# Patient Record
Sex: FEMALE | URBAN - METROPOLITAN AREA
[De-identification: names, ages, dates, MRNs, and addresses within clinical notes are randomized per-mention and may not be internally consistent; named-entity substitution may affect disease eponyms.]

---

## 2018-06-05 ENCOUNTER — APPOINTMENT (RX ONLY)
Dept: URBAN - METROPOLITAN AREA CLINIC 26 | Facility: CLINIC | Age: 73
Setting detail: DERMATOLOGY
End: 2018-06-05

## 2018-06-05 DIAGNOSIS — L81.4 OTHER MELANIN HYPERPIGMENTATION: ICD-10-CM

## 2018-06-05 DIAGNOSIS — Z85.828 PERSONAL HISTORY OF OTHER MALIGNANT NEOPLASM OF SKIN: ICD-10-CM

## 2018-06-05 DIAGNOSIS — L82.1 OTHER SEBORRHEIC KERATOSIS: ICD-10-CM

## 2018-06-05 DIAGNOSIS — L57.8 OTHER SKIN CHANGES DUE TO CHRONIC EXPOSURE TO NONIONIZING RADIATION: ICD-10-CM

## 2018-06-05 DIAGNOSIS — D22 MELANOCYTIC NEVI: ICD-10-CM

## 2018-06-05 DIAGNOSIS — D18.0 HEMANGIOMA: ICD-10-CM

## 2018-06-05 PROBLEM — D18.01 HEMANGIOMA OF SKIN AND SUBCUTANEOUS TISSUE: Status: ACTIVE | Noted: 2018-06-05

## 2018-06-05 PROBLEM — D22.5 MELANOCYTIC NEVI OF TRUNK: Status: ACTIVE | Noted: 2018-06-05

## 2018-06-05 PROCEDURE — ? COUNSELING

## 2018-06-05 PROCEDURE — ? SUNSCREEN RECOMMENDATIONS

## 2018-06-05 PROCEDURE — 99214 OFFICE O/P EST MOD 30 MIN: CPT

## 2018-06-05 ASSESSMENT — LOCATION ZONE DERM
LOCATION ZONE: TRUNK
LOCATION ZONE: LEG
LOCATION ZONE: FACE
LOCATION ZONE: ARM

## 2018-06-05 ASSESSMENT — LOCATION SIMPLE DESCRIPTION DERM
LOCATION SIMPLE: RIGHT FOREARM
LOCATION SIMPLE: LEFT UPPER ARM
LOCATION SIMPLE: LEFT CHEEK
LOCATION SIMPLE: LEFT PRETIBIAL REGION
LOCATION SIMPLE: ABDOMEN
LOCATION SIMPLE: LOWER BACK
LOCATION SIMPLE: LEFT FOREARM
LOCATION SIMPLE: RIGHT UPPER ARM
LOCATION SIMPLE: RIGHT PRETIBIAL REGION
LOCATION SIMPLE: RIGHT UPPER BACK

## 2018-06-05 ASSESSMENT — LOCATION DETAILED DESCRIPTION DERM
LOCATION DETAILED: LEFT PROXIMAL PRETIBIAL REGION
LOCATION DETAILED: EPIGASTRIC SKIN
LOCATION DETAILED: SUPERIOR LUMBAR SPINE
LOCATION DETAILED: LEFT VENTRAL PROXIMAL FOREARM
LOCATION DETAILED: RIGHT INFERIOR MEDIAL UPPER BACK
LOCATION DETAILED: RIGHT PROXIMAL PRETIBIAL REGION
LOCATION DETAILED: PERIUMBILICAL SKIN
LOCATION DETAILED: LEFT ANTERIOR DISTAL UPPER ARM
LOCATION DETAILED: RIGHT VENTRAL PROXIMAL FOREARM
LOCATION DETAILED: LEFT INFERIOR CENTRAL MALAR CHEEK
LOCATION DETAILED: RIGHT ANTERIOR DISTAL UPPER ARM

## 2018-12-04 ENCOUNTER — APPOINTMENT (RX ONLY)
Dept: URBAN - METROPOLITAN AREA CLINIC 26 | Facility: CLINIC | Age: 73
Setting detail: DERMATOLOGY
End: 2018-12-04

## 2018-12-04 DIAGNOSIS — Z85.828 PERSONAL HISTORY OF OTHER MALIGNANT NEOPLASM OF SKIN: ICD-10-CM

## 2018-12-04 DIAGNOSIS — L57.8 OTHER SKIN CHANGES DUE TO CHRONIC EXPOSURE TO NONIONIZING RADIATION: ICD-10-CM

## 2018-12-04 DIAGNOSIS — D18.0 HEMANGIOMA: ICD-10-CM

## 2018-12-04 DIAGNOSIS — L20.89 OTHER ATOPIC DERMATITIS: ICD-10-CM

## 2018-12-04 DIAGNOSIS — L81.4 OTHER MELANIN HYPERPIGMENTATION: ICD-10-CM

## 2018-12-04 DIAGNOSIS — D22 MELANOCYTIC NEVI: ICD-10-CM

## 2018-12-04 DIAGNOSIS — L82.1 OTHER SEBORRHEIC KERATOSIS: ICD-10-CM

## 2018-12-04 PROBLEM — D22.5 MELANOCYTIC NEVI OF TRUNK: Status: ACTIVE | Noted: 2018-12-04

## 2018-12-04 PROBLEM — L20.84 INTRINSIC (ALLERGIC) ECZEMA: Status: ACTIVE | Noted: 2018-12-04

## 2018-12-04 PROBLEM — D18.01 HEMANGIOMA OF SKIN AND SUBCUTANEOUS TISSUE: Status: ACTIVE | Noted: 2018-12-04

## 2018-12-04 PROCEDURE — 99214 OFFICE O/P EST MOD 30 MIN: CPT

## 2018-12-04 PROCEDURE — ? SUNSCREEN RECOMMENDATIONS

## 2018-12-04 PROCEDURE — ? PRESCRIPTION

## 2018-12-04 PROCEDURE — ? COUNSELING

## 2018-12-04 RX ORDER — CLOBETASOL PROPIONATE 0.5 MG/G
OINTMENT TOPICAL
Qty: 1 | Refills: 2 | Status: ERX | COMMUNITY
Start: 2018-12-04

## 2018-12-04 RX ADMIN — CLOBETASOL PROPIONATE: 0.5 OINTMENT TOPICAL at 00:00

## 2018-12-04 ASSESSMENT — LOCATION SIMPLE DESCRIPTION DERM
LOCATION SIMPLE: RIGHT HAND
LOCATION SIMPLE: RIGHT FOREARM
LOCATION SIMPLE: LEFT FOREARM
LOCATION SIMPLE: LEFT UPPER ARM
LOCATION SIMPLE: RIGHT UPPER BACK
LOCATION SIMPLE: LOWER BACK
LOCATION SIMPLE: RIGHT PRETIBIAL REGION
LOCATION SIMPLE: ABDOMEN
LOCATION SIMPLE: LEFT PRETIBIAL REGION
LOCATION SIMPLE: LEFT CHEEK
LOCATION SIMPLE: RIGHT UPPER ARM

## 2018-12-04 ASSESSMENT — LOCATION ZONE DERM
LOCATION ZONE: TRUNK
LOCATION ZONE: HAND
LOCATION ZONE: FACE
LOCATION ZONE: LEG
LOCATION ZONE: ARM

## 2018-12-04 ASSESSMENT — LOCATION DETAILED DESCRIPTION DERM
LOCATION DETAILED: RIGHT INFERIOR MEDIAL UPPER BACK
LOCATION DETAILED: RIGHT PROXIMAL PRETIBIAL REGION
LOCATION DETAILED: LEFT INFERIOR CENTRAL MALAR CHEEK
LOCATION DETAILED: RIGHT VENTRAL PROXIMAL FOREARM
LOCATION DETAILED: SUPERIOR LUMBAR SPINE
LOCATION DETAILED: EPIGASTRIC SKIN
LOCATION DETAILED: RIGHT ANTERIOR DISTAL UPPER ARM
LOCATION DETAILED: PERIUMBILICAL SKIN
LOCATION DETAILED: LEFT PROXIMAL PRETIBIAL REGION
LOCATION DETAILED: RIGHT RADIAL DORSAL HAND
LOCATION DETAILED: LEFT ANTERIOR DISTAL UPPER ARM
LOCATION DETAILED: LEFT VENTRAL PROXIMAL FOREARM

## 2018-12-19 ENCOUNTER — APPOINTMENT (RX ONLY)
Dept: URBAN - METROPOLITAN AREA CLINIC 26 | Facility: CLINIC | Age: 73
Setting detail: DERMATOLOGY
End: 2018-12-19

## 2018-12-19 DIAGNOSIS — L81.4 OTHER MELANIN HYPERPIGMENTATION: ICD-10-CM

## 2018-12-19 PROCEDURE — ? Q-SWITCHED LASER

## 2018-12-19 ASSESSMENT — LOCATION SIMPLE DESCRIPTION DERM: LOCATION SIMPLE: RIGHT PRETIBIAL REGION

## 2018-12-19 ASSESSMENT — LOCATION DETAILED DESCRIPTION DERM: LOCATION DETAILED: RIGHT PROXIMAL PRETIBIAL REGION

## 2018-12-19 ASSESSMENT — LOCATION ZONE DERM: LOCATION ZONE: LEG

## 2018-12-19 NOTE — PROCEDURE: Q-SWITCHED LASER
Spot Size In Mm: 2
External Cooling Fan Speed: 0
Anesthesia Type: 1% lidocaine with epinephrine
Endpoint: Immediate endpoint whitening. Post care reviewed with patient.
Frequency In Hz: 5
Price (Use Numbers Only, No Special Characters Or $): 200
Detail Level: Detailed
Treatment Number: 1
Consent: Verbal consent obtained, risks reviewed including but not limited to crusting, scabbing, blistering, scarring, darker or lighter pigmentary change, systemic reactions, ulceration, incidental hair removal, bruising, and/or incomplete removal.
Post-Care Instructions: I reviewed with the patient in detail post-care instructions. Patient should avoid sunlight and wear sun protection.

## 2019-06-04 ENCOUNTER — APPOINTMENT (RX ONLY)
Dept: URBAN - METROPOLITAN AREA CLINIC 26 | Facility: CLINIC | Age: 74
Setting detail: DERMATOLOGY
End: 2019-06-04

## 2019-06-04 DIAGNOSIS — L82.1 OTHER SEBORRHEIC KERATOSIS: ICD-10-CM

## 2019-06-04 DIAGNOSIS — Z85.828 PERSONAL HISTORY OF OTHER MALIGNANT NEOPLASM OF SKIN: ICD-10-CM | Status: RESOLVED

## 2019-06-04 DIAGNOSIS — L81.4 OTHER MELANIN HYPERPIGMENTATION: ICD-10-CM

## 2019-06-04 DIAGNOSIS — D18.0 HEMANGIOMA: ICD-10-CM

## 2019-06-04 DIAGNOSIS — D22 MELANOCYTIC NEVI: ICD-10-CM

## 2019-06-04 DIAGNOSIS — L57.0 ACTINIC KERATOSIS: ICD-10-CM

## 2019-06-04 PROBLEM — D22.71 MELANOCYTIC NEVI OF RIGHT LOWER LIMB, INCLUDING HIP: Status: ACTIVE | Noted: 2019-06-04

## 2019-06-04 PROBLEM — D22.72 MELANOCYTIC NEVI OF LEFT LOWER LIMB, INCLUDING HIP: Status: ACTIVE | Noted: 2019-06-04

## 2019-06-04 PROBLEM — D18.01 HEMANGIOMA OF SKIN AND SUBCUTANEOUS TISSUE: Status: ACTIVE | Noted: 2019-06-04

## 2019-06-04 PROCEDURE — ? SUNSCREEN RECOMMENDATIONS

## 2019-06-04 PROCEDURE — ? INVENTORY

## 2019-06-04 PROCEDURE — 17003 DESTRUCT PREMALG LES 2-14: CPT

## 2019-06-04 PROCEDURE — 17000 DESTRUCT PREMALG LESION: CPT

## 2019-06-04 PROCEDURE — ? COUNSELING

## 2019-06-04 PROCEDURE — 99214 OFFICE O/P EST MOD 30 MIN: CPT | Mod: 25

## 2019-06-04 PROCEDURE — ? LIQUID NITROGEN

## 2019-06-04 ASSESSMENT — LOCATION DETAILED DESCRIPTION DERM
LOCATION DETAILED: EPIGASTRIC SKIN
LOCATION DETAILED: RIGHT ANTERIOR DISTAL UPPER ARM
LOCATION DETAILED: LEFT ANTERIOR DISTAL UPPER ARM
LOCATION DETAILED: RIGHT INFERIOR CENTRAL MALAR CHEEK
LOCATION DETAILED: LEFT MEDIAL SUPERIOR CHEST
LOCATION DETAILED: RIGHT PROXIMAL POSTERIOR THIGH
LOCATION DETAILED: RIGHT SUPERIOR MEDIAL UPPER BACK
LOCATION DETAILED: LEFT PROXIMAL POSTERIOR THIGH

## 2019-06-04 ASSESSMENT — LOCATION SIMPLE DESCRIPTION DERM
LOCATION SIMPLE: ABDOMEN
LOCATION SIMPLE: CHEST
LOCATION SIMPLE: RIGHT UPPER BACK
LOCATION SIMPLE: RIGHT UPPER ARM
LOCATION SIMPLE: RIGHT CHEEK
LOCATION SIMPLE: LEFT POSTERIOR THIGH
LOCATION SIMPLE: RIGHT POSTERIOR THIGH
LOCATION SIMPLE: LEFT UPPER ARM

## 2019-06-04 ASSESSMENT — LOCATION ZONE DERM
LOCATION ZONE: FACE
LOCATION ZONE: TRUNK
LOCATION ZONE: LEG
LOCATION ZONE: ARM

## 2019-06-04 NOTE — PROCEDURE: MIPS QUALITY
Name And Contact Information For Health Care Proxy: Baldo Mati [phone] Name And Contact Information For Health Care Proxy: Baldo Mati [phone]

## 2019-06-04 NOTE — PROCEDURE: LIQUID NITROGEN
Consent: The patient's consent was obtained including but not limited to risks of crusting, scabbing, blistering, scarring, darker or lighter pigmentary change, recurrence, incomplete removal and infection.
Total Number Of Aks Treated: 4
Render Post-Care Instructions In Note?: no
Post-Care Instructions: I reviewed with the patient in detail post-care instructions. Patient is to wear sunprotection, and avoid picking at any of the treated lesions. Pt may apply Vaseline to crusted or scabbing areas.
Duration Of Freeze Thaw-Cycle (Seconds): 0
Detail Level: Zone

## 2019-08-19 ENCOUNTER — APPOINTMENT (RX ONLY)
Dept: URBAN - METROPOLITAN AREA CLINIC 26 | Facility: CLINIC | Age: 74
Setting detail: DERMATOLOGY
End: 2019-08-19

## 2019-08-19 DIAGNOSIS — L82.1 OTHER SEBORRHEIC KERATOSIS: ICD-10-CM

## 2019-08-19 PROCEDURE — ? COUNSELING

## 2019-08-19 PROCEDURE — 99213 OFFICE O/P EST LOW 20 MIN: CPT

## 2019-08-19 ASSESSMENT — LOCATION ZONE DERM
LOCATION ZONE: ARM
LOCATION ZONE: TRUNK

## 2019-08-19 ASSESSMENT — LOCATION DETAILED DESCRIPTION DERM
LOCATION DETAILED: LEFT INFERIOR MEDIAL UPPER BACK
LOCATION DETAILED: RIGHT ANTERIOR SHOULDER

## 2019-08-19 ASSESSMENT — LOCATION SIMPLE DESCRIPTION DERM
LOCATION SIMPLE: RIGHT SHOULDER
LOCATION SIMPLE: LEFT UPPER BACK

## 2019-08-19 NOTE — HPI: SKIN LESION
Has Your Skin Lesion Been Treated?: not been treated
Is This A New Presentation, Or A Follow-Up?: Skin Lesions
Additional History: Patient states that she has 2 on the right should she would like evaluated; she states her  noticed the one on the lower back and wanted to make sure it was not concerning.

## 2019-12-04 ENCOUNTER — APPOINTMENT (RX ONLY)
Dept: URBAN - METROPOLITAN AREA CLINIC 26 | Facility: CLINIC | Age: 74
Setting detail: DERMATOLOGY
End: 2019-12-04

## 2019-12-04 DIAGNOSIS — L82.1 OTHER SEBORRHEIC KERATOSIS: ICD-10-CM

## 2019-12-04 DIAGNOSIS — D22 MELANOCYTIC NEVI: ICD-10-CM

## 2019-12-04 DIAGNOSIS — L82.0 INFLAMED SEBORRHEIC KERATOSIS: ICD-10-CM

## 2019-12-04 DIAGNOSIS — L57.8 OTHER SKIN CHANGES DUE TO CHRONIC EXPOSURE TO NONIONIZING RADIATION: ICD-10-CM

## 2019-12-04 DIAGNOSIS — Z85.828 PERSONAL HISTORY OF OTHER MALIGNANT NEOPLASM OF SKIN: ICD-10-CM

## 2019-12-04 PROBLEM — D22.9 MELANOCYTIC NEVI, UNSPECIFIED: Status: ACTIVE | Noted: 2019-12-04

## 2019-12-04 PROCEDURE — ? LIQUID NITROGEN

## 2019-12-04 PROCEDURE — ? PATIENT SPECIFIC COUNSELING

## 2019-12-04 PROCEDURE — 99214 OFFICE O/P EST MOD 30 MIN: CPT | Mod: 25

## 2019-12-04 PROCEDURE — 17110 DESTRUCTION B9 LES UP TO 14: CPT

## 2019-12-04 PROCEDURE — ? FULL BODY SKIN EXAM

## 2019-12-04 PROCEDURE — ? COUNSELING

## 2019-12-04 PROCEDURE — ? MEDICARE ABN

## 2019-12-04 ASSESSMENT — LOCATION DETAILED DESCRIPTION DERM: LOCATION DETAILED: RIGHT ANTERIOR SHOULDER

## 2019-12-04 ASSESSMENT — LOCATION ZONE DERM: LOCATION ZONE: ARM

## 2019-12-04 ASSESSMENT — LOCATION SIMPLE DESCRIPTION DERM: LOCATION SIMPLE: RIGHT SHOULDER

## 2019-12-04 NOTE — PROCEDURE: MIPS QUALITY
Detail Level: Detailed
Quality 130: Documentation Of Current Medications In The Medical Record: Current Medications Documented
Quality 431: Preventive Care And Screening: Unhealthy Alcohol Use - Screening: Patient screened for unhealthy alcohol use using a single question and scores less than 2 times per year
Quality 226: Preventive Care And Screening: Tobacco Use: Screening And Cessation Intervention: Patient screened for tobacco use and is an ex/non-smoker
Quality 47: Advance Care Plan: Advance Care Planning discussed and documented; advance care plan or surrogate decision maker documented in the medical record.

## 2019-12-04 NOTE — PROCEDURE: MEDICARE ABN
Procedure (Limit To 20 Characters): benign destruction
Payment Option: Option 1: Bill Medicare, await for decision on payment.
Detail Level: Detailed
Reason?: Additional Information
Modifier Description: The following is a description of the modifiers listed below. GA - is added to claims with a properly executed Advanced Beneficiary Notice (ABN) in the file. GY - is added to claims in which the item or service is statutorily excluded, does not meet the definition of any Medicare benefit, or -for non-Medicare Insurers- is not a contract benefit. GZ - - Item or service expected to be denied as not reasonable and necessary.

## 2020-01-14 ENCOUNTER — APPOINTMENT (RX ONLY)
Dept: URBAN - METROPOLITAN AREA CLINIC 23 | Facility: CLINIC | Age: 75
Setting detail: DERMATOLOGY
End: 2020-01-14

## 2020-01-14 DIAGNOSIS — L81.4 OTHER MELANIN HYPERPIGMENTATION: ICD-10-CM

## 2020-01-14 DIAGNOSIS — L85.3 XEROSIS CUTIS: ICD-10-CM

## 2020-01-14 PROCEDURE — ? PRESCRIPTION

## 2020-01-14 PROCEDURE — 99213 OFFICE O/P EST LOW 20 MIN: CPT

## 2020-01-14 PROCEDURE — ? COUNSELING

## 2020-01-14 PROCEDURE — ? ADDITIONAL NOTES

## 2020-01-14 RX ORDER — TRIAMCINOLONE ACETONIDE 1 MG/G
CREAM TOPICAL
Qty: 1 | Refills: 2 | Status: ERX | COMMUNITY
Start: 2020-01-14

## 2020-01-14 RX ADMIN — TRIAMCINOLONE ACETONIDE: 1 CREAM TOPICAL at 00:00

## 2020-01-14 ASSESSMENT — LOCATION DETAILED DESCRIPTION DERM
LOCATION DETAILED: RIGHT PROXIMAL PRETIBIAL REGION
LOCATION DETAILED: RIGHT DISTAL PRETIBIAL REGION
LOCATION DETAILED: LEFT DISTAL PRETIBIAL REGION

## 2020-01-14 ASSESSMENT — LOCATION SIMPLE DESCRIPTION DERM
LOCATION SIMPLE: LEFT PRETIBIAL REGION
LOCATION SIMPLE: RIGHT PRETIBIAL REGION

## 2020-01-14 ASSESSMENT — LOCATION ZONE DERM: LOCATION ZONE: LEG

## 2020-01-14 NOTE — PROCEDURE: ADDITIONAL NOTES
Additional Notes: Recommend chemical peels with strict sun protection. It was explained that she may need a couple of sessions to see lightening effects.
Detail Level: Detailed

## 2020-06-18 ENCOUNTER — APPOINTMENT (RX ONLY)
Dept: URBAN - METROPOLITAN AREA CLINIC 26 | Facility: CLINIC | Age: 75
Setting detail: DERMATOLOGY
End: 2020-06-18

## 2020-06-18 DIAGNOSIS — D69.2 OTHER NONTHROMBOCYTOPENIC PURPURA: ICD-10-CM

## 2020-06-18 PROCEDURE — 99212 OFFICE O/P EST SF 10 MIN: CPT

## 2020-06-18 PROCEDURE — ? ADDITIONAL NOTES

## 2020-06-18 PROCEDURE — ? COUNSELING

## 2020-06-18 ASSESSMENT — LOCATION ZONE DERM: LOCATION ZONE: LEG

## 2020-06-18 ASSESSMENT — LOCATION SIMPLE DESCRIPTION DERM: LOCATION SIMPLE: LEFT PRETIBIAL REGION

## 2020-06-18 ASSESSMENT — LOCATION DETAILED DESCRIPTION DERM: LOCATION DETAILED: LEFT PROXIMAL PRETIBIAL REGION

## 2020-06-18 NOTE — HPI: SKIN LESION
What Type Of Note Output Would You Prefer (Optional)?: Bullet Format
How Severe Is Your Skin Lesion?: mild
Has Your Skin Lesion Been Treated?: not been treated
Is This A New Presentation, Or A Follow-Up?: Skin Lesion
Which Family Member (Optional)?: Mother
Additional History: Pt states that she has this spot on her leg for years but today she peeled off a piece of skin and now it turned purple and red.

## 2020-10-19 ENCOUNTER — APPOINTMENT (RX ONLY)
Dept: URBAN - METROPOLITAN AREA CLINIC 26 | Facility: CLINIC | Age: 75
Setting detail: DERMATOLOGY
End: 2020-10-19

## 2020-10-19 DIAGNOSIS — S1012XA BLISTER OF FACE, NECK, AND SCALP EXCEPT EYE, WITHOUT MENTION OF INFECTION: ICD-10-CM

## 2020-10-19 DIAGNOSIS — L57.0 ACTINIC KERATOSIS: ICD-10-CM

## 2020-10-19 DIAGNOSIS — S0092XA BLISTER OF FACE, NECK, AND SCALP EXCEPT EYE, WITHOUT MENTION OF INFECTION: ICD-10-CM

## 2020-10-19 DIAGNOSIS — L259 CONTACT DERMATITIS AND OTHER ECZEMA, UNSPECIFIED CAUSE: ICD-10-CM | Status: RESOLVING

## 2020-10-19 DIAGNOSIS — S00522A BLISTER OF FACE, NECK, AND SCALP EXCEPT EYE, WITHOUT MENTION OF INFECTION: ICD-10-CM

## 2020-10-19 DIAGNOSIS — S00429A BLISTER OF FACE, NECK, AND SCALP EXCEPT EYE, WITHOUT MENTION OF INFECTION: ICD-10-CM

## 2020-10-19 DIAGNOSIS — L81.8 OTHER SPECIFIED DISORDERS OF PIGMENTATION: ICD-10-CM

## 2020-10-19 DIAGNOSIS — S1092XA BLISTER OF FACE, NECK, AND SCALP EXCEPT EYE, WITHOUT MENTION OF INFECTION: ICD-10-CM

## 2020-10-19 DIAGNOSIS — S0032XA BLISTER OF FACE, NECK, AND SCALP EXCEPT EYE, WITHOUT MENTION OF INFECTION: ICD-10-CM

## 2020-10-19 DIAGNOSIS — S00521A BLISTER OF FACE, NECK, AND SCALP EXCEPT EYE, WITHOUT MENTION OF INFECTION: ICD-10-CM

## 2020-10-19 DIAGNOSIS — S0002XA BLISTER OF FACE, NECK, AND SCALP EXCEPT EYE, WITHOUT MENTION OF INFECTION: ICD-10-CM

## 2020-10-19 PROBLEM — L30.9 DERMATITIS, UNSPECIFIED: Status: ACTIVE | Noted: 2020-10-19

## 2020-10-19 PROBLEM — S90.821A BLISTER (NONTHERMAL), RIGHT FOOT, INITIAL ENCOUNTER: Status: ACTIVE | Noted: 2020-10-19

## 2020-10-19 PROCEDURE — ? ADDITIONAL NOTES

## 2020-10-19 PROCEDURE — ? COUNSELING

## 2020-10-19 PROCEDURE — ? TREATMENT REGIMEN

## 2020-10-19 PROCEDURE — ? PRESCRIPTION

## 2020-10-19 PROCEDURE — 99214 OFFICE O/P EST MOD 30 MIN: CPT

## 2020-10-19 RX ORDER — CALCIPOTRIENE 0.05 MG/G
1 CREAM TOPICAL
Qty: 1 | Refills: 1 | Status: ERX | COMMUNITY
Start: 2020-10-19

## 2020-10-19 RX ORDER — FLUOROURACIL 50 MG/G
1 CREAM TOPICAL
Qty: 1 | Refills: 1 | Status: ERX | COMMUNITY
Start: 2020-10-19

## 2020-10-19 RX ADMIN — CALCIPOTRIENE 1: 0.05 CREAM TOPICAL at 00:00

## 2020-10-19 RX ADMIN — FLUOROURACIL 1: 50 CREAM TOPICAL at 00:00

## 2020-10-19 ASSESSMENT — LOCATION DETAILED DESCRIPTION DERM
LOCATION DETAILED: RIGHT ACHILLES SKIN
LOCATION DETAILED: LEFT CENTRAL MALAR CHEEK
LOCATION DETAILED: RIGHT CENTRAL MALAR CHEEK
LOCATION DETAILED: RIGHT CENTRAL MANDIBULAR CHEEK
LOCATION DETAILED: LEFT CENTRAL MANDIBULAR CHEEK
LOCATION DETAILED: NASAL DORSUM
LOCATION DETAILED: RIGHT MEDIAL MALAR CHEEK
LOCATION DETAILED: RIGHT HEEL
LOCATION DETAILED: UPPER STERNUM
LOCATION DETAILED: LEFT INFERIOR CENTRAL MALAR CHEEK
LOCATION DETAILED: LEFT MEDIAL MALAR CHEEK

## 2020-10-19 ASSESSMENT — LOCATION SIMPLE DESCRIPTION DERM
LOCATION SIMPLE: CHEST
LOCATION SIMPLE: RIGHT ACHILLES SKIN
LOCATION SIMPLE: RIGHT HEEL
LOCATION SIMPLE: RIGHT CHEEK
LOCATION SIMPLE: LEFT CHEEK
LOCATION SIMPLE: NOSE

## 2020-10-19 ASSESSMENT — LOCATION ZONE DERM
LOCATION ZONE: FACE
LOCATION ZONE: LEG
LOCATION ZONE: FEET
LOCATION ZONE: TRUNK
LOCATION ZONE: NOSE

## 2020-10-19 NOTE — PROCEDURE: TREATMENT REGIMEN
Initiate Treatment: 5-fluorouracil 5% cream + calcipotriene 0.05% cream bid x 4-7days to face as directed
Detail Level: Zone
Plan: rto 1 mo prn\\nrisks/benefits/alternatives reviewed. all questions answered. instruction handout reviewed and provided to patient.
Otc Regimen: strict photoprotection, including broad spectrum sunscreen with spf 30+ daily

## 2020-10-19 NOTE — PROCEDURE: REASSURANCE
Hide Additional Notes?: No
Additional Notes (Optional): Agree with patient and PCP, could have been an ACD to the blue (non-medical) surgical masks as patient states rash was mostly in the areas of face (upper cheeks and jawline area) where the mask was touching skin, as well as upper chest where mask would touch when she'd take it off. Rash cleared on face, faint residual noted on chest, ok to continue with the topical steroid prescribed by PCP til clear then d/c. Patient to continue with cloth masks.
Detail Level: Simple

## 2020-10-19 NOTE — HPI: RASH
Is The Patient Presenting As Previously Scheduled?: Yes
Is This A New Presentation, Or A Follow-Up?: Rash
Additional History: Patient states that she previously saw Dr. George (PCP) 3 weeks before today’s visit, patient was prescribed Cortisone Cream that she used qday to BID for 2 weeks. Patient is no longer using this cream. Patient has also switched to cloth masks about 4 weeks ago rather than blue surgical masks, PCP thought that might be causing the irritation.

## 2020-12-01 ENCOUNTER — APPOINTMENT (RX ONLY)
Dept: URBAN - METROPOLITAN AREA CLINIC 26 | Facility: CLINIC | Age: 75
Setting detail: DERMATOLOGY
End: 2020-12-01

## 2020-12-01 DIAGNOSIS — D18.0 HEMANGIOMA: ICD-10-CM

## 2020-12-01 DIAGNOSIS — D69.2 OTHER NONTHROMBOCYTOPENIC PURPURA: ICD-10-CM

## 2020-12-01 DIAGNOSIS — L71.8 OTHER ROSACEA: ICD-10-CM | Status: RESOLVED

## 2020-12-01 DIAGNOSIS — Z85.828 PERSONAL HISTORY OF OTHER MALIGNANT NEOPLASM OF SKIN: ICD-10-CM

## 2020-12-01 DIAGNOSIS — L82.1 OTHER SEBORRHEIC KERATOSIS: ICD-10-CM

## 2020-12-01 DIAGNOSIS — L57.0 ACTINIC KERATOSIS: ICD-10-CM

## 2020-12-01 DIAGNOSIS — D22 MELANOCYTIC NEVI: ICD-10-CM

## 2020-12-01 DIAGNOSIS — L72.8 OTHER FOLLICULAR CYSTS OF THE SKIN AND SUBCUTANEOUS TISSUE: ICD-10-CM

## 2020-12-01 DIAGNOSIS — L72.0 EPIDERMAL CYST: ICD-10-CM

## 2020-12-01 DIAGNOSIS — L85.3 XEROSIS CUTIS: ICD-10-CM

## 2020-12-01 DIAGNOSIS — L81.4 OTHER MELANIN HYPERPIGMENTATION: ICD-10-CM

## 2020-12-01 PROBLEM — D18.01 HEMANGIOMA OF SKIN AND SUBCUTANEOUS TISSUE: Status: ACTIVE | Noted: 2020-12-01

## 2020-12-01 PROBLEM — L30.9 DERMATITIS, UNSPECIFIED: Status: ACTIVE | Noted: 2020-12-01

## 2020-12-01 PROBLEM — D22.9 MELANOCYTIC NEVI, UNSPECIFIED: Status: ACTIVE | Noted: 2020-12-01

## 2020-12-01 PROCEDURE — ? PHOTO-DOCUMENTATION

## 2020-12-01 PROCEDURE — 17003 DESTRUCT PREMALG LES 2-14: CPT

## 2020-12-01 PROCEDURE — 99214 OFFICE O/P EST MOD 30 MIN: CPT | Mod: 25

## 2020-12-01 PROCEDURE — ? COUNSELING

## 2020-12-01 PROCEDURE — ? ADDITIONAL NOTES

## 2020-12-01 PROCEDURE — 17000 DESTRUCT PREMALG LESION: CPT

## 2020-12-01 PROCEDURE — ? FULL BODY SKIN EXAM

## 2020-12-01 PROCEDURE — ? LIQUID NITROGEN

## 2020-12-01 ASSESSMENT — LOCATION SIMPLE DESCRIPTION DERM
LOCATION SIMPLE: LEFT PRETIBIAL REGION
LOCATION SIMPLE: NOSE
LOCATION SIMPLE: LEFT FOREARM
LOCATION SIMPLE: RIGHT FOOT
LOCATION SIMPLE: RIGHT EAR
LOCATION SIMPLE: RIGHT CHEEK
LOCATION SIMPLE: CHEST

## 2020-12-01 ASSESSMENT — LOCATION DETAILED DESCRIPTION DERM
LOCATION DETAILED: LEFT NASAL DORSUM
LOCATION DETAILED: RIGHT INFERIOR MEDIAL MALAR CHEEK
LOCATION DETAILED: RIGHT CYMBA CONCHA
LOCATION DETAILED: MIDDLE STERNUM
LOCATION DETAILED: LEFT DISTAL DORSAL FOREARM
LOCATION DETAILED: RIGHT LATERAL DORSAL FOOT
LOCATION DETAILED: LEFT PROXIMAL PRETIBIAL REGION
LOCATION DETAILED: RIGHT CENTRAL MALAR CHEEK

## 2020-12-01 ASSESSMENT — LOCATION ZONE DERM
LOCATION ZONE: EAR
LOCATION ZONE: ARM
LOCATION ZONE: NOSE
LOCATION ZONE: FACE
LOCATION ZONE: LEG
LOCATION ZONE: TRUNK
LOCATION ZONE: FEET

## 2020-12-01 NOTE — PROCEDURE: LIQUID NITROGEN
Render Post-Care Instructions In Note?: no
Post-Care Instructions: I reviewed with the patient in detail post-care instructions. Patient is to wear sunprotection, and avoid picking at any of the treated lesions. Pt may apply Vaseline to crusted or scabbing areas.
Number Of Freeze-Thaw Cycles: 2 freeze-thaw cycles
Detail Level: Detailed
Duration Of Freeze Thaw-Cycle (Seconds): 15
Consent: The patient's consent was obtained including but not limited to risks of crusting, scabbing, blistering, scarring, darker or lighter pigmentary change, recurrence, incomplete removal and infection. Verbal consent also obtained.

## 2020-12-01 NOTE — PROCEDURE: ADDITIONAL NOTES
Additional Notes: Patient consent was obtained to proceed with the visit and recommended plan of care after discussion of all risks and benefits, including the risks of COVID-19 exposure.
Detail Level: Simple
Additional Notes: Pt says it resolved with steroids.

## 2021-04-14 DIAGNOSIS — Z23 ENCOUNTER FOR IMMUNIZATION: ICD-10-CM

## 2022-05-11 ENCOUNTER — APPOINTMENT (RX ONLY)
Dept: URBAN - METROPOLITAN AREA CLINIC 374 | Facility: CLINIC | Age: 77
Setting detail: DERMATOLOGY
End: 2022-05-11

## 2022-05-11 DIAGNOSIS — Z41.9 ENCOUNTER FOR PROCEDURE FOR PURPOSES OTHER THAN REMEDYING HEALTH STATE, UNSPECIFIED: ICD-10-CM

## 2022-05-11 PROCEDURE — ? HYDRAFACIAL

## 2022-05-11 NOTE — PROCEDURE: HYDRAFACIAL
Procedure: Boost
Vacuum Pressure Low Setting (Will Not Render If Set To 0): 0
Indication: skin texture
Tip: Hydropeel Tip, Clear
Post-Care Instructions: I reviewed with the patient in detail post-care instructions. Patient should stay away from the sun and wear sun protection until treated areas are fully healed.
Procedure: Fusion
Tip: Hydropeel Tip, Blue
Solution: GlySal 7.5%
Consent: Written consent obtained, risks reviewed including but not limited to crusting, scabbing, blistering, scarring, darker or lighter pigmentary change, bruising, and/or incomplete response.
Procedure: Exfoliation
Tip: Hydropeel Tip, Teal
Solution Override
Location: face
Procedure: Extraction
Solution: Activ-4
Procedure: Extend and Protect
Solution: Beta-HD
Price (Use Numbers Only, No Special Characters Or $): 229.00
Tip Override
Procedure: Peel
Treatment Number: 1

## 2022-09-27 ENCOUNTER — TRANSCRIBE ORDERS (OUTPATIENT)
Dept: SCHEDULING | Age: 77
End: 2022-09-27

## 2022-09-27 DIAGNOSIS — Z01.812 ENCOUNTER FOR PREPROCEDURAL LABORATORY EXAMINATION: Primary | ICD-10-CM

## 2022-11-10 ENCOUNTER — APPOINTMENT (OUTPATIENT)
Dept: LAB | Facility: HOSPITAL | Age: 77
End: 2022-11-10
Payer: MEDICARE

## 2022-11-10 DIAGNOSIS — Z01.812 ENCOUNTER FOR PREPROCEDURAL LABORATORY EXAMINATION: ICD-10-CM

## 2022-11-10 LAB — SARS-COV-2 RNA RESP QL NAA+PROBE: NEGATIVE

## 2022-11-10 PROCEDURE — U0003 INFECTIOUS AGENT DETECTION BY NUCLEIC ACID (DNA OR RNA); SEVERE ACUTE RESPIRATORY SYNDROME CORONAVIRUS 2 (SARS-COV-2) (CORONAVIRUS DISEASE [COVID-19]), AMPLIFIED PROBE TECHNIQUE, MAKING USE OF HIGH THROUGHPUT TECHNOLOGIES AS DESCRIBED BY CMS-2020-01-R: HCPCS

## 2022-11-10 PROCEDURE — C9803 HOPD COVID-19 SPEC COLLECT: HCPCS

## 2022-11-11 ENCOUNTER — APPOINTMENT (OUTPATIENT)
Dept: PREADMISSION TESTING | Facility: HOSPITAL | Age: 77
End: 2022-11-11
Payer: MEDICARE

## 2022-11-11 VITALS — WEIGHT: 178 LBS | BODY MASS INDEX: 29.66 KG/M2 | HEIGHT: 65 IN

## 2022-11-11 RX ORDER — CLONAZEPAM 0.5 MG/1
0.5 TABLET ORAL NIGHTLY
COMMUNITY

## 2022-11-11 RX ORDER — LANOLIN ALCOHOL/MO/W.PET/CERES
1000 CREAM (GRAM) TOPICAL DAILY
COMMUNITY

## 2022-11-11 ASSESSMENT — PAIN SCALES - GENERAL: PAINLEVEL: 4

## 2022-11-11 NOTE — DISCHARGE INSTRUCTIONS
Please see Dr. Galan's discharge instructions.     DVT Prophylaxis:   -Please take Eliquis 2.5mg twice daily for 30 days to prevent blood clots. You may start it Tuesday evening.     Incision Care:  -Please remove your surgical dressing on 11/19/2022. At that time if the wound is dry and not draining, you can leave it uncovered, open to air. If there is drainage, please place 4x4s covered by paper tape over your incision.  -Please do not submerge incision in water, no baths, no swimming, no pools, no hot tubs, etc.   -Please keep incision clean and dry. Once your dressing has been removed, do not scrub the incision in the shower and pat dry with a clean towel not used to dry your body.   -Please make sure your incision(s) are checked for signs and symptoms of infection: If any of the below should occur, please call the office:   -drainage from incision site, opening of incision, increased redness and/or tenderness, fevers greater than 101, flu-like symptoms.   -Please call office or go to ED with any thigh/calf pain or swelling in legs, chest pain, chest congestion, problems with breathing.     Constipation/Pain Medication:   -Take your pain medication with food. Do not drive while taking pain medication.   -Pain medications may cause constipation.   -If you have not had a bowel movement in 3 days please call the office   - Please take Senna-Colace as prescribed. Hold for loose stool. If you are having difficulties having a bowel   movement or haven't had bowel movement in 2 days, please add over the counter miralax and take as directed on package.   -Drink plenty of fluids and eat fresh fruits and vegetables.   -DO NOT SMOKE! Smoking is not healthy for your healing process.

## 2022-11-11 NOTE — PRE-PROCEDURE INSTRUCTIONS
1. Admissions will call you with your arrival time on 11/11  (day prior to surgery) between 2pm-4pm.  For questions about your arrival time, please call [phone].     2. On the day of your procedure please report to the [facility] in the [place]. Please arrive through the [address] Entrance.  If you are parking in the [facility] [place] [facility], come to the ground floor of the garage and follow signs to the [facility].  After being screened, please report to either the Outpatient Registration for Pre-Admission Testing or to the Surgery Registration Desk.    3. Please follow the following fasting guidelines:     No solid food EIGHT HOURS prior to arrival  Unlimited clear liquids, meaning water or PLAIN black coffee WITHOUT any milk, cream, sugar, or sweetener are permitted up to TWO HOURS prior to arrival at the hospital.    4. Early on the morning of the procedure please take your usual dose of the listed medications with a sip of water:  Tylenol if needed.     5. Other Instructions: You may brush your teeth the morning of the procedure. Rinse and spit, do not swallow.  Bring a list of your medications with dosages.  Use surgical wash as directed.     6. If you develop a cold, cough, fever, rash, or other symptom prior to the day of the procedure, please report it to your physician immediately.    7. If you need to cancel the procedure for any reason, please contact your physician.    8. Make arrangements to have someone drive you home from the procedure. If you have not arranged for transportation home, your surgery may be cancelled.     9. You may not take public transportation unless accompanied by a responsible person.    10. You may not drive a car or operate complex or potentially dangerous machinery for 24 hours following anesthesia and/or sedation.    11.  If it is medically necessary for you to have a longer stay, you will be informed as soon as the decision is made.    12. Only bring  essential items to the hospital.  Do not wear or bring anything of value to the hospital including jewelry of any kind, money, or wallet. Do not wear make-up or contact lenses.  DO NOT BRING MEDICATIONS FROM HOME unless instructed to do so. DO bring your hearing aids, glasses, and a case    13. No lotion, creams, powders, or oils on skin the morning of procedure     14. Dress in comfortable clothes.    15.  If instructed, please bring a copy of your Advanced Directive (Living Will/Durable Power of ) on the day of your procedure.     16. Patients need to quarantine from the time of PAT COVID test to day of surgery, regardless of COVID vaccine status.      17. Ensuring your safety at all times is a very important part of our [facility] Culture of Safety. After having surgery and sedation, you are at risk for falling and balance issues. Although you may feel awake, the effects of the medication can last up to 24 hours after anesthesia. If you need to use the bathroom during your recovery period, nursing staff will escort you there and stay with you to ensure your safety.    18. Refrain from drinking alcohol and smoking cigarettes for 24 hours prior to surgery.    19. Shower with antibacterial soap (Dial) the night before and morning of your procedure.  If your procedure indicates the need for CHG antiseptic wash (Bactoshield or Hibiclens), please use this instead and follow instructions as discussed at the time of your Pre-Admission Testing phone interview or visit.    Above instructions reviewed with patient and patient acknowledges understanding.    Form explained by: Rebeca Eaton RN

## 2022-11-14 ENCOUNTER — HOSPITAL ENCOUNTER (INPATIENT)
Facility: HOSPITAL | Age: 77
LOS: 1 days | Discharge: HOME | DRG: 468 | End: 2022-11-15
Payer: MEDICARE

## 2022-11-14 ENCOUNTER — ANESTHESIA (OUTPATIENT)
Dept: OPERATING ROOM | Facility: HOSPITAL | Age: 77
Setting detail: SURGERY ADMIT
DRG: 468 | End: 2022-11-14
Payer: MEDICARE

## 2022-11-14 ENCOUNTER — APPOINTMENT (INPATIENT)
Dept: RADIOLOGY | Facility: HOSPITAL | Age: 77
DRG: 468 | End: 2022-11-14
Attending: NURSE PRACTITIONER
Payer: MEDICARE

## 2022-11-14 DIAGNOSIS — T84.039A: ICD-10-CM

## 2022-11-14 PROBLEM — Z96.652 S/P REVISION OF TOTAL KNEE, LEFT: Status: ACTIVE | Noted: 2022-11-14

## 2022-11-14 PROCEDURE — 12000000 HC ROOM AND CARE MED/SURG

## 2022-11-14 PROCEDURE — C1713 ANCHOR/SCREW BN/BN,TIS/BN: HCPCS

## 2022-11-14 PROCEDURE — 71000011 HC PACU PHASE 1 EA ADDL MIN

## 2022-11-14 PROCEDURE — 63700000 HC SELF-ADMINISTRABLE DRUG

## 2022-11-14 PROCEDURE — 63600000 HC DRUGS/DETAIL CODE: Performed by: SPECIALIST

## 2022-11-14 PROCEDURE — 63700000 HC SELF-ADMINISTRABLE DRUG: Performed by: NURSE PRACTITIONER

## 2022-11-14 PROCEDURE — 25000000 HC PHARMACY GENERAL: Performed by: SPECIALIST

## 2022-11-14 PROCEDURE — 97162 PT EVAL MOD COMPLEX 30 MIN: CPT | Mod: GP

## 2022-11-14 PROCEDURE — 25800000 HC PHARMACY IV SOLUTIONS

## 2022-11-14 PROCEDURE — 25800000 HC PHARMACY IV SOLUTIONS: Performed by: SPECIALIST

## 2022-11-14 PROCEDURE — 0SRD0J9 REPLACEMENT OF LEFT KNEE JOINT WITH SYNTHETIC SUBSTITUTE, CEMENTED, OPEN APPROACH: ICD-10-PCS

## 2022-11-14 PROCEDURE — 27200000 HC STERILE SUPPLY

## 2022-11-14 PROCEDURE — 0SPD0JZ REMOVAL OF SYNTHETIC SUBSTITUTE FROM LEFT KNEE JOINT, OPEN APPROACH: ICD-10-PCS

## 2022-11-14 PROCEDURE — 71000001 HC PACU PHASE 1 INITIAL 30MIN

## 2022-11-14 PROCEDURE — 37000010 HC ANESTHESIA SPINAL

## 2022-11-14 PROCEDURE — 25000000 HC PHARMACY GENERAL

## 2022-11-14 PROCEDURE — 63600000 HC DRUGS/DETAIL CODE

## 2022-11-14 PROCEDURE — 63600000 HC DRUGS/DETAIL CODE: Performed by: NURSE PRACTITIONER

## 2022-11-14 PROCEDURE — 73560 X-RAY EXAM OF KNEE 1 OR 2: CPT | Mod: LT

## 2022-11-14 PROCEDURE — 36000015 HC OR LEVEL 5 EA ADDL MIN

## 2022-11-14 PROCEDURE — 25800000 HC PHARMACY IV SOLUTIONS: Performed by: NURSE PRACTITIONER

## 2022-11-14 PROCEDURE — 87075 CULTR BACTERIA EXCEPT BLOOD: CPT

## 2022-11-14 PROCEDURE — 27800000 HC SUPPLY/IMPLANTS

## 2022-11-14 PROCEDURE — C1776 JOINT DEVICE (IMPLANTABLE): HCPCS

## 2022-11-14 PROCEDURE — 36000005 HC OR LEVEL 5 INITIAL 30MIN

## 2022-11-14 DEVICE — STEM TRIATHLON TOTAL KNEE CEMENTED: Type: IMPLANTABLE DEVICE | Site: KNEE | Status: FUNCTIONAL

## 2022-11-14 DEVICE — IMPLANTABLE DEVICE: Type: IMPLANTABLE DEVICE | Site: KNEE | Status: FUNCTIONAL

## 2022-11-14 DEVICE — CEMENT BONE SIMPLEX W/TOBRAMYCIN: Type: IMPLANTABLE DEVICE | Site: KNEE | Status: FUNCTIONAL

## 2022-11-14 DEVICE — BONE PLUG MEDIUM: Type: IMPLANTABLE DEVICE | Site: KNEE | Status: FUNCTIONAL

## 2022-11-14 DEVICE — AUGMENT FEMORAL DISTAL TRIATHLON LEFT: Type: IMPLANTABLE DEVICE | Site: KNEE | Status: FUNCTIONAL

## 2022-11-14 DEVICE — BASEPLATE TIBIAL UNIV SZ 4: Type: IMPLANTABLE DEVICE | Site: KNEE | Status: FUNCTIONAL

## 2022-11-14 RX ORDER — DIPHENHYDRAMINE HCL 25 MG
25 CAPSULE ORAL EVERY 6 HOURS PRN
Status: DISCONTINUED | OUTPATIENT
Start: 2022-11-14 | End: 2022-11-15 | Stop reason: HOSPADM

## 2022-11-14 RX ORDER — SODIUM CHLORIDE, SODIUM GLUCONATE, SODIUM ACETATE, POTASSIUM CHLORIDE AND MAGNESIUM CHLORIDE 30; 37; 368; 526; 502 MG/100ML; MG/100ML; MG/100ML; MG/100ML; MG/100ML
INJECTION, SOLUTION INTRAVENOUS CONTINUOUS PRN
Status: DISCONTINUED | OUTPATIENT
Start: 2022-11-14 | End: 2022-11-14 | Stop reason: SURG

## 2022-11-14 RX ORDER — DEXAMETHASONE SODIUM PHOSPHATE 4 MG/ML
8 INJECTION, SOLUTION INTRA-ARTICULAR; INTRALESIONAL; INTRAMUSCULAR; INTRAVENOUS; SOFT TISSUE ONCE
Status: COMPLETED | OUTPATIENT
Start: 2022-11-15 | End: 2022-11-15

## 2022-11-14 RX ORDER — SODIUM CHLORIDE 9 MG/ML
80 INJECTION, SOLUTION INTRAVENOUS CONTINUOUS
Status: DISCONTINUED | OUTPATIENT
Start: 2022-11-14 | End: 2022-11-15 | Stop reason: HOSPADM

## 2022-11-14 RX ORDER — CLONAZEPAM 0.5 MG/1
0.5 TABLET ORAL NIGHTLY
Status: DISCONTINUED | OUTPATIENT
Start: 2022-11-14 | End: 2022-11-15 | Stop reason: HOSPADM

## 2022-11-14 RX ORDER — OXYCODONE HYDROCHLORIDE 5 MG/1
5-10 TABLET ORAL EVERY 4 HOURS PRN
Status: DISCONTINUED | OUTPATIENT
Start: 2022-11-14 | End: 2022-11-15 | Stop reason: HOSPADM

## 2022-11-14 RX ORDER — BISACODYL 10 MG/1
10 SUPPOSITORY RECTAL DAILY PRN
Status: DISCONTINUED | OUTPATIENT
Start: 2022-11-14 | End: 2022-11-15 | Stop reason: HOSPADM

## 2022-11-14 RX ORDER — FENTANYL CITRATE 50 UG/ML
50 INJECTION, SOLUTION INTRAMUSCULAR; INTRAVENOUS EVERY 5 MIN PRN
Status: DISCONTINUED | OUTPATIENT
Start: 2022-11-14 | End: 2022-11-14 | Stop reason: HOSPADM

## 2022-11-14 RX ORDER — ACETAMINOPHEN 325 MG/1
975 TABLET ORAL
Status: COMPLETED | OUTPATIENT
Start: 2022-11-14 | End: 2022-11-14

## 2022-11-14 RX ORDER — CELECOXIB 200 MG/1
CAPSULE ORAL
Status: COMPLETED
Start: 2022-11-14 | End: 2022-11-14

## 2022-11-14 RX ORDER — ACETAMINOPHEN 325 MG/1
TABLET ORAL
Status: COMPLETED
Start: 2022-11-14 | End: 2022-11-14

## 2022-11-14 RX ORDER — AMOXICILLIN 250 MG
1 CAPSULE ORAL 2 TIMES DAILY
Status: DISCONTINUED | OUTPATIENT
Start: 2022-11-14 | End: 2022-11-15 | Stop reason: HOSPADM

## 2022-11-14 RX ORDER — ONDANSETRON 4 MG/1
4 TABLET, ORALLY DISINTEGRATING ORAL EVERY 8 HOURS PRN
Status: DISCONTINUED | OUTPATIENT
Start: 2022-11-14 | End: 2022-11-15 | Stop reason: HOSPADM

## 2022-11-14 RX ORDER — KETOROLAC TROMETHAMINE 15 MG/ML
15 INJECTION, SOLUTION INTRAMUSCULAR; INTRAVENOUS
Status: DISCONTINUED | OUTPATIENT
Start: 2022-11-14 | End: 2022-11-15 | Stop reason: HOSPADM

## 2022-11-14 RX ORDER — IBUPROFEN 200 MG
16-32 TABLET ORAL AS NEEDED
Status: DISCONTINUED | OUTPATIENT
Start: 2022-11-14 | End: 2022-11-15 | Stop reason: HOSPADM

## 2022-11-14 RX ORDER — PROPOFOL 10 MG/ML
INJECTION, EMULSION INTRAVENOUS CONTINUOUS PRN
Status: DISCONTINUED | OUTPATIENT
Start: 2022-11-14 | End: 2022-11-14 | Stop reason: SURG

## 2022-11-14 RX ORDER — DIPHENHYDRAMINE HCL 50 MG/ML
25 VIAL (ML) INJECTION EVERY 6 HOURS PRN
Status: DISCONTINUED | OUTPATIENT
Start: 2022-11-14 | End: 2022-11-15 | Stop reason: HOSPADM

## 2022-11-14 RX ORDER — DEXTROSE 50 % IN WATER (D50W) INTRAVENOUS SYRINGE
25 AS NEEDED
Status: DISCONTINUED | OUTPATIENT
Start: 2022-11-14 | End: 2022-11-15 | Stop reason: HOSPADM

## 2022-11-14 RX ORDER — FENTANYL CITRATE 50 UG/ML
INJECTION, SOLUTION INTRAMUSCULAR; INTRAVENOUS AS NEEDED
Status: DISCONTINUED | OUTPATIENT
Start: 2022-11-14 | End: 2022-11-14 | Stop reason: SURG

## 2022-11-14 RX ORDER — MIDAZOLAM HYDROCHLORIDE 2 MG/2ML
INJECTION, SOLUTION INTRAMUSCULAR; INTRAVENOUS AS NEEDED
Status: DISCONTINUED | OUTPATIENT
Start: 2022-11-14 | End: 2022-11-14 | Stop reason: SURG

## 2022-11-14 RX ORDER — ONDANSETRON HYDROCHLORIDE 2 MG/ML
4 INJECTION, SOLUTION INTRAVENOUS EVERY 8 HOURS PRN
Status: DISCONTINUED | OUTPATIENT
Start: 2022-11-14 | End: 2022-11-15 | Stop reason: HOSPADM

## 2022-11-14 RX ORDER — SODIUM CHLORIDE 9 MG/ML
INJECTION, SOLUTION INTRAVENOUS CONTINUOUS PRN
Status: DISCONTINUED | OUTPATIENT
Start: 2022-11-14 | End: 2022-11-14 | Stop reason: SURG

## 2022-11-14 RX ORDER — NAPROXEN SODIUM 220 MG/1
81 TABLET, FILM COATED ORAL 2 TIMES DAILY
Status: DISCONTINUED | OUTPATIENT
Start: 2022-11-14 | End: 2022-11-15 | Stop reason: HOSPADM

## 2022-11-14 RX ORDER — CELECOXIB 200 MG/1
400 CAPSULE ORAL
Status: COMPLETED | OUTPATIENT
Start: 2022-11-14 | End: 2022-11-14

## 2022-11-14 RX ORDER — ONDANSETRON HYDROCHLORIDE 2 MG/ML
4 INJECTION, SOLUTION INTRAVENOUS
Status: DISCONTINUED | OUTPATIENT
Start: 2022-11-14 | End: 2022-11-14 | Stop reason: HOSPADM

## 2022-11-14 RX ORDER — PHENYLEPHRINE HYDROCHLORIDE 10 MG/ML
INJECTION INTRAVENOUS AS NEEDED
Status: DISCONTINUED | OUTPATIENT
Start: 2022-11-14 | End: 2022-11-14 | Stop reason: SURG

## 2022-11-14 RX ORDER — HYDROMORPHONE HYDROCHLORIDE 1 MG/ML
0.5 INJECTION, SOLUTION INTRAMUSCULAR; INTRAVENOUS; SUBCUTANEOUS
Status: DISCONTINUED | OUTPATIENT
Start: 2022-11-14 | End: 2022-11-14 | Stop reason: HOSPADM

## 2022-11-14 RX ORDER — BUPIVACAINE HYDROCHLORIDE 7.5 MG/ML
INJECTION, SOLUTION INTRASPINAL
Status: COMPLETED | OUTPATIENT
Start: 2022-11-14 | End: 2022-11-14

## 2022-11-14 RX ORDER — DEXTROSE 40 %
15-30 GEL (GRAM) ORAL AS NEEDED
Status: DISCONTINUED | OUTPATIENT
Start: 2022-11-14 | End: 2022-11-15 | Stop reason: HOSPADM

## 2022-11-14 RX ORDER — ALUMINUM HYDROXIDE, MAGNESIUM HYDROXIDE, AND SIMETHICONE 1200; 120; 1200 MG/30ML; MG/30ML; MG/30ML
30 SUSPENSION ORAL EVERY 4 HOURS PRN
Status: DISCONTINUED | OUTPATIENT
Start: 2022-11-14 | End: 2022-11-15 | Stop reason: HOSPADM

## 2022-11-14 RX ORDER — ACETAMINOPHEN 325 MG/1
650 TABLET ORAL
Status: DISCONTINUED | OUTPATIENT
Start: 2022-11-14 | End: 2022-11-15 | Stop reason: HOSPADM

## 2022-11-14 RX ORDER — POLYETHYLENE GLYCOL 3350 17 G/17G
17 POWDER, FOR SOLUTION ORAL DAILY
Status: DISCONTINUED | OUTPATIENT
Start: 2022-11-14 | End: 2022-11-15 | Stop reason: HOSPADM

## 2022-11-14 RX ADMIN — TRANEXAMIC ACID 1600 MG: 100 INJECTION INTRAVENOUS at 11:52

## 2022-11-14 RX ADMIN — MIDAZOLAM HYDROCHLORIDE 2 MG: 1 INJECTION, SOLUTION INTRAMUSCULAR; INTRAVENOUS at 11:23

## 2022-11-14 RX ADMIN — PHENYLEPHRINE HYDROCHLORIDE 100 MCG: 10 INJECTION INTRAVENOUS at 13:18

## 2022-11-14 RX ADMIN — OXYCODONE HYDROCHLORIDE 5 MG: 5 TABLET ORAL at 16:30

## 2022-11-14 RX ADMIN — FENTANYL CITRATE 100 MCG: 50 INJECTION INTRAMUSCULAR; INTRAVENOUS at 11:25

## 2022-11-14 RX ADMIN — BUPIVACAINE HYDROCHLORIDE IN DEXTROSE 2 ML: 7.5 INJECTION, SOLUTION SUBARACHNOID at 11:29

## 2022-11-14 RX ADMIN — ACETAMINOPHEN 975 MG: 325 TABLET ORAL at 10:42

## 2022-11-14 RX ADMIN — FENTANYL CITRATE 50 MCG: 50 INJECTION INTRAMUSCULAR; INTRAVENOUS at 12:02

## 2022-11-14 RX ADMIN — SODIUM CHLORIDE 80 ML/HR: 9 INJECTION, SOLUTION INTRAVENOUS at 16:29

## 2022-11-14 RX ADMIN — POLYETHYLENE GLYCOL 3350 17 G: 17 POWDER, FOR SOLUTION ORAL at 16:30

## 2022-11-14 RX ADMIN — FENTANYL CITRATE 50 MCG: 50 INJECTION INTRAMUSCULAR; INTRAVENOUS at 11:43

## 2022-11-14 RX ADMIN — SODIUM CHLORIDE, SODIUM GLUCONATE, SODIUM ACETATE, POTASSIUM CHLORIDE AND MAGNESIUM CHLORIDE: 526; 502; 368; 37; 30 INJECTION, SOLUTION INTRAVENOUS at 12:44

## 2022-11-14 RX ADMIN — ASPIRIN 81 MG CHEWABLE TABLET 81 MG: 81 TABLET CHEWABLE at 20:11

## 2022-11-14 RX ADMIN — ACETAMINOPHEN 650 MG: 325 TABLET ORAL at 16:30

## 2022-11-14 RX ADMIN — ACETAMINOPHEN 975 MG: 325 TABLET, FILM COATED ORAL at 10:42

## 2022-11-14 RX ADMIN — SODIUM CHLORIDE 1250 MG: 9 INJECTION, SOLUTION INTRAVENOUS at 10:43

## 2022-11-14 RX ADMIN — PROPOFOL 40 MCG/KG/MIN: 10 INJECTION, EMULSION INTRAVENOUS at 11:30

## 2022-11-14 RX ADMIN — SENNOSIDES AND DOCUSATE SODIUM 1 TABLET: 50; 8.6 TABLET ORAL at 20:11

## 2022-11-14 RX ADMIN — KETOROLAC TROMETHAMINE 15 MG: 15 INJECTION, SOLUTION INTRAMUSCULAR; INTRAVENOUS at 23:28

## 2022-11-14 RX ADMIN — CELECOXIB 400 MG: 200 CAPSULE ORAL at 10:42

## 2022-11-14 RX ADMIN — CLONAZEPAM 0.5 MG: 0.5 TABLET ORAL at 21:37

## 2022-11-14 RX ADMIN — CEFAZOLIN 2 G: 2 INJECTION, POWDER, FOR SOLUTION INTRAMUSCULAR; INTRAVENOUS at 20:12

## 2022-11-14 RX ADMIN — SODIUM CHLORIDE: 0.9 INJECTION, SOLUTION INTRAVENOUS at 10:59

## 2022-11-14 RX ADMIN — ACETAMINOPHEN 650 MG: 325 TABLET ORAL at 23:28

## 2022-11-14 RX ADMIN — KETOROLAC TROMETHAMINE 15 MG: 15 INJECTION, SOLUTION INTRAMUSCULAR; INTRAVENOUS at 16:30

## 2022-11-14 RX ADMIN — CEFAZOLIN 2 G: 10 INJECTION, POWDER, FOR SOLUTION INTRAVENOUS at 11:40

## 2022-11-14 RX ADMIN — OXYCODONE HYDROCHLORIDE 10 MG: 5 TABLET ORAL at 21:36

## 2022-11-14 ASSESSMENT — COGNITIVE AND FUNCTIONAL STATUS - GENERAL
AFFECT: WFL
STANDING UP FROM CHAIR USING ARMS: 3 - A LITTLE
WALKING IN HOSPITAL ROOM: 3 - A LITTLE
MOVING TO AND FROM BED TO CHAIR: 3 - A LITTLE
CLIMB 3 TO 5 STEPS WITH RAILING: 3 - A LITTLE

## 2022-11-14 ASSESSMENT — PAIN SCALES - GENERAL: PAIN_LEVEL: 0

## 2022-11-14 NOTE — PROGRESS NOTES
Orthopaedics Post-op Check    [S]  No acute distress. Pain controlled.     [O]   Vitals:    11/14/22 1716   BP: 131/62   Pulse: 78   Resp: 18   Temp: 36.3 °C (97.3 °F)   SpO2: 94%       Physical Exam:  SILT  +DP/PT  +EHL/FHL/PF/DF  Dressing C/d/i  Quad fires    [A/P]   77 y.o. female status post left total knee arthroplasty, Day of Surgery, doing well    -DVT prophylaxis  -Weight bearing as tolerated   -Analgesia  -Physical therapy    Leon Kiran MD

## 2022-11-14 NOTE — PLAN OF CARE
Plan of Care Review  Plan of Care Reviewed With: patient  Progress: improving  Outcome Summary: Patient oriented to room. PRN oxycodone given for pain along w/ ATC Tylenol and Toradol. Patient is DTV @ 21:30. PT/OT eval being done shortly. Patient hopeful for DC home tomorrow.

## 2022-11-14 NOTE — ANESTHESIA PROCEDURE NOTES
Spinal Block    Patient location during procedure: OR  Start time: 11/14/2022 11:25 AM  Staffing  Performed: anesthesiologist   Anesthesiologist: Radha Ghotra MD  Reason for block: primary anesthetic  Preanesthetic Checklist  Completed: patient identified, site marked, surgical consent, pre-op evaluation, timeout performed, IV checked, risks and benefits discussed, monitors and equipment checked and sterile field maintained during procedure  Spinal Block  Patient position: sitting  Prep: Betadine and site prepped and draped  Patient monitoring: heart rate, cardiac monitor, continuous pulse ox and blood pressure  Approach: midline  Location: L2-3  Injection technique: single-shot  Needle  Needle type: Quincke   Needle gauge: 22 G  Needle length: 3.5 in  Assessment  Events: cerebrospinal fluid  Additional Notes  Procedure well tolerated. Vital signs stable.    Medications Administered -   bupivacaine PF (MARCAINE SPINAL) 0.75% intrathecal injection - intrathecal   2 mL - 11/14/2022 11:29:00 AM

## 2022-11-14 NOTE — HOSPITAL COURSE
Mignon is a 77 y.o. female admitted on 11/14/2022 with Mechanical loosening of prosthetic joint, unspecified joint, initial encounter (CMS/[facility]) [T84.039A]  S/P revision of total knee, left [Z96.652]. Principal problem is S/P revision of total knee, left.    Past Medical History  Mignon has a past medical history of Aortic regurgitation, Arthritis, Deep vein thrombosis (CMS/[facility]), Fibromyalgia, Hydronephrosis, Mitral regurgitation, and MVP (mitral valve prolapse).    History of Present Illness   S/p L TKRevision

## 2022-11-14 NOTE — PROGRESS NOTES
Orthopedic Post Surgical Note    78 y/o female s/p left total knee arthroplasty     Physical Exam:  - dressing clean, dry, intact with mepilex to left knee   - palpable DP, PT pulses  - no motor/sensation due to spinal anesthesia    A/P:   - pain control  - physical therapy/occupational therapy  - DVT prophylaxis  - recheck motor/sensation   .

## 2022-11-14 NOTE — ANESTHESIOLOGIST PRE-PROCEDURE ATTESTATION
Pre-Procedure Patient Identification:  I am the Primary Anesthesiologist and have identified the patient on 11/14/22 at 10:33 AM.   I have confirmed the procedure(s) will be performed by the following surgeon/proceduralist Jayson Galan MD.

## 2022-11-14 NOTE — OR SURGEON
Pre-Procedure patient identification:  I am the primary operating surgeon/proceduralist and I have identified the patient and confirmed laterality is left on 11/14/22 at 9:52 AM Jayson Galan MD  Phone Number: [phone]

## 2022-11-14 NOTE — PROGRESS NOTES
Patient: Mignon Kaur  Location:  [facility] 5PAV 5454  MRN:  [id number]  Today's date:  11/14/2022  Pt left seated in bedside chair. NAD. Call bell within reach. Chair alarm activated.  Mignon is a 77 y.o. female admitted on 11/14/2022 with Mechanical loosening of prosthetic joint, unspecified joint, initial encounter (CMS/[facility]) [T84.039A]  S/P revision of total knee, left [Z96.652]. Principal problem is S/P revision of total knee, left.    Past Medical History  Mignon has a past medical history of Aortic regurgitation, Arthritis, Deep vein thrombosis (CMS/[facility]), Fibromyalgia, Hydronephrosis, Mitral regurgitation, and MVP (mitral valve prolapse).    History of Present Illness   S/p L TKRevision       11/14/22 1655   Pain/Comfort/Sleep   Pain Charting Type Pain Assessment   Preferred Pain Scale number (Numeric Rating Pain Scale)   (0-10) Pain Rating: Rest 6   (0-10) Pain Rating: Activity 5   Pain Body Location knee   Pain Management Interventions position adjusted   Vitals   Heart Rate 71   Heart Rate Source Monitor   Resp 18   SpO2 94 %   Patient Activity At rest   Oxygen Therapy None (Room air)   /89   BP Location Right upper arm   BP Method Automatic   Patient Position Lying      11/14/22 1716   Vitals   Heart Rate 78   Heart Rate Source Monitor   Resp 18   SpO2 94 %   Patient Activity At rest   Oxygen Therapy None (Room air)   /62   BP Location Right upper arm   BP Method Automatic   Patient Position Sitting     PT Vitals    Date/Time Pulse HR Source Resp SpO2 Pt Activity O2 Therapy BP BP Location BP Method Pt Position Who   11/14/22 1655 71 Monitor 18 94 % At rest None (Room air) 122/89 Right upper arm Automatic Lying GRD      PT Pain    Date/Time Pain Type Location Rating: Rest Rating: Activity Interventions [facility]   11/14/22 1655 Pain Assessment knee 6 5 position adjusted LAP          Prior Living Environment    Flowsheet Row Most Recent Value   Current Living Arrangements home   Living  Environment Comment lives with spouse. 1 DAX. FF with uni HR to second story bed and bath        Prior Level of Function    Flowsheet Row Most Recent Value   Ambulation independent   Transferring independent   Toileting independent   Bathing independent   Dressing independent   Eating independent           PT Evaluation and Treatment - 11/14/22 1655        PT Time Calculation    Start Time 1655     Stop Time 1715     Time Calculation (min) 20 min        Session Details    Document Type initial evaluation     Mode of Treatment physical therapy        General Information    Patient Profile Reviewed yes     Patient/Family/Caregiver Comments/Observations RN cleared pt for PT     Existing Precautions/Restrictions fall;weight bearing        Weight-bearing Status    Left LE Weight-Bearing Status weight-bearing as tolerated (WBAT)        Cognition/Psychosocial    Affect/Mental Status (Cognition) WFL        Sensory Assessment (Somatosensory)    Sensory Assessment (Somatosensory) LE sensation intact        Range of Motion (ROM)    Range of Motion ROM is WNL;right lower extremity     Left Lower Extremity (ROM) knee     Knee, Left (ROM) 7-70        Strength (Manual Muscle Testing)    Strength (Manual Muscle Testing) strength is WFL;right lower extremity        Bed Mobility    Gillespie, Supine to Sit supervision     Assistive Device head of bed elevated     Comment (Bed Mobility) slow to advance to EOB however good static sitting balance noted        Sit to Stand Transfer    Gillespie, Sit to Stand Transfer supervision     Assistive Device walker, front-wheeled     Comment pt denies any lightheadedness, dizziness with initial sit to stand        Stand to Sit Transfer    Gillespie, Stand to Sit Transfer supervision     Assistive Device walker, front-wheeled     Comment fair eccentric control noted        Gait Training    Gillespie, Gait supervision     Assistive Device walker, front-wheeled     Distance in Feet 120  feet     Pattern (Gait) step-through     Deviations/Abnormal Patterns (Gait) stride length decreased;step length decreased;gait speed decreased     Comment (Gait/Stairs) pt denies significant increase in pain during ambulation vs at rest. no significant antalgia noted        Balance    Static Sitting Balance WFL     Dynamic Sitting Balance mild impairment     Static Standing Balance mild impairment     Dynamic Standing Balance mild impairment        AM-PAC (TM) - Mobility (Current Function)    Turning from your back to your side while in a flat bed without using bedrails? 3 - A Little     Moving from lying on your back to sitting on the side of a flat bed without using bedrails? 3 - A Little     Moving to and from a bed to a chair? 3 - A Little     Standing up from a chair using your arms? 3 - A Little     To walk in a hospital room? 3 - A Little     Climbing 3-5 steps with a railing? 3 - A Little     AM-PAC (TM) Mobility Score 18        Assessment/Plan (PT)    Daily Outcome Statement pt ambulating in hallways with RW. no assistance required. denies increase in pain vs at rest     Rehab Potential good, to achieve stated therapy goals     Therapy Frequency 5-7 times/wk     Planned Therapy Interventions balance training;bed mobility training;gait training;transfer training;stair training               PT Assessment/Plan    Flowsheet Row Most Recent Value   PT Recommended Discharge Disposition home with assistance, home with outpatient services at 11/14/2022 1655   Anticipated Equipment Needs at Discharge (PT) none at 11/14/2022 1655   Patient/Family Therapy Goals Statement home at [place] at 11/14/2022 1655                    Education Documentation  Rehabilitation Therapy, taught by Ben Honeycutt, PT at 11/14/2022  5:33 PM.  Learner: Family, Patient  Readiness: Acceptance  Method: Explanation  Response: Verbalizes Understanding  Comment: role of PT, POC, safe mobility techniques          PT Goals    Flowsheet Row Most  Recent Value   Bed Mobility Goal 1    Activity/Assistive Device bed mobility activities, all at 11/14/2022 1655   [place] independent at 11/14/2022 1655   Time Frame by discharge at 11/14/2022 1655   Progress/Outcome goal ongoing at 11/14/2022 1655   Transfer Goal 1    Activity/Assistive Device all transfers at 11/14/2022 1655   [place] independent at 11/14/2022 1655   Time Frame by discharge at 11/14/2022 1655   Progress/Outcome goal ongoing at 11/14/2022 1655   Gait Training Goal 1    Activity/Assistive Device gait (walking locomotion) at 11/14/2022 1655   [place] modified independence at 11/14/2022 1655   Distance 200 at 11/14/2022 1655   Time Frame by discharge at 11/14/2022 1655   Progress/Outcome goal ongoing at 11/14/2022 1655   ROM Goal 1    ROM Goal 1 0-90 at 11/14/2022 1655   Time Frame by discharge at 11/14/2022 1655   Progress/Outcome goal ongoing at 11/14/2022 1655   Stairs Goal 1    Activity/Assistive Device stairs, all skills at 11/14/2022 1655   [place] independent at 11/14/2022 1655   Number of Stairs 10 at 11/14/2022 1655   Time Frame by discharge at 11/14/2022 1655   Progress/Outcome goal ongoing at 11/14/2022 1655

## 2022-11-14 NOTE — ANESTHESIA PREPROCEDURE EVALUATION
Relevant Problems   No relevant active problems       Anesthesia ROS/MED HX    Anesthesia History    Previous anesthetics  Pulmonary - neg  Neuro/Psych - neg  Cardiovascular- neg   Cardiac clearance reviewed and Covid19 Test Reviewed  Hematological - neg  GI/Hepatic- neg  Musculoskeletal   Osteoarthritis  Endo/Other- neg  Body Habitus: Overweight  ROS/MED HX Comments:    Renal Disease: hydronephrosis       Past Surgical History:   Procedure Laterality Date    BUNIONECTOMY      HYSTERECTOMY      TONSILLECTOMY      TOTAL KNEE ARTHROPLASTY      URETERAL STENT PLACEMENT         Physical Exam    Airway   Mallampati: II   TM distance: >3 FB   Neck ROM: full  Cardiovascular - normal   Rhythm: regular   Rate: normalPulmonary - normal   clear to auscultation  Dental - normal        Anesthesia Plan    Plan: spinal    Technique: spinal     Airway: natural airway / supplemental oxygen       patient did not smoke on day of surgery  ASA 2  Anesthetic plan and risks discussed with: patient  Induction:    intravenous   Postop Plan:   Patient Disposition: inpatient floor planned admission   Pain Management: IV analgesics  Comments:    Plan: Patient requests spinal; accepts GA if necessary

## 2022-11-14 NOTE — OP NOTE
Patient Name: Mignon Kaur  MRN #: [id number]  : 1945  Admit Date: 2022  Procedure Date:22  PREOPERATIVE DIAGNOSIS: Painful left total knee    POSTOPERATIVE DIAGNOSIS: same     PROCEDURE PERFORMED: Total knee revision. Aminta Triathlon implant size 4 femur, 4 tibia, 11 TS mm tibial spacer, and 12 x 50 mm stem on the tibial component a 15 x 50 mm stem on the femoral component and 2 5 mm distal augments on the femoral component with a medium size canal plug on the femur and a small canal plug on the tibia    SURGEON: Jayson Galan MD.    ASSISTANT: TAE    ANESTHESIA: spinal sedation    COMPLICATIONS: None.    SPECIMENS: None    BLOOD LOSS. 10 cc    DESCRIPTION OF PROCEDURE: The patient was seen in the preoperative area and the chart was reviewed and signed.  Knee was marked with my initials.  Patient was brought back to the operative suite, placed under anesthesia.  A tourniquet was placed on the upper thigh.  Prepping and draping was carried out in the usual fashion for total knee replacement in laminar airflow room.  Timeout was performed.  Antibiotic administration was confirmed.  Tourniquet was inflated to 300 mmHg.  Direct anterior incision was made through her previous incision.  Her preoperative range of motion was 0-70°    Extensive scar tissue was encountered and a synovectomy was carried out..  2 cultures were obtained as soon as we entered the joint and the fluid was class I.    The femoral and tibial components were removed without significant difficulty using a Morteza saw osteotomes and tamps.    Successive size reamers were placed in the femoral canal and the tibial canal.  The tibia was reamed to a size 13 and the femur was reamed to a size 16.    Fresh cuts were made on the tibia and the femur using intramedullary guides.  The location of the femoral distal cut was determined using the medial epicondylar indicator on the femoral cutting jig.    The tibial plateau measured a size  4.  The femur measured a size 4 as well with the distal cut indicating that we would need 5 mm distal augments on the femur in order to restore the anatomic joint line.      The 4-in-1 guide was used to cut the distal femur.  Trial reductions were carried out and there was excellent range of motion, full extension, excellent stability and no ligament balancing was necessary with the 11 mm tibial insert. The reduction and stability was checked with PS insert.    The patella was not resurfaced.    All the trials were removed.  Copious irrigation was carried out .    A medium-sized bone plug was placed in the femur and a small size plug was placed in the tibia.  3 packs of antibiotic impregnated cement were mixed on the back table and then the implants were cemented into place first cementing the femur followed by the tibia.  The tibial plateau was snapped into place and the reinforcing peg was inserted..    The knee was brought to full extension and was held in that position until the cement hardened..     A combination of 0.5% Marcaine and epinephrine, Duramorph, Depo-Medrol was injected into all the soft tissues.  Copious irrigation was carried out and the wound was then closed in layers with Size 2 Quill in the fascial layer, 2-0 Vicryl in the subcutaneous tissue, and 3-0 running Monocryl in the skin with Dermabond.  A Mepilex dressing was placed.  Patient was taken to recovery room in satisfactory condition and tolerated the procedure well.  Sponge, needle, and instrument count correct ×2.    VIVIANA NEWBY MD

## 2022-11-14 NOTE — PLAN OF CARE
Problem: Adult Inpatient Plan of Care  Goal: Plan of Care Review  Outcome: Progressing  Flowsheets (Taken 11/14/2022 4853)  Progress: improving  Plan of Care Reviewed With: patient  Outcome Summary: PT mary completed

## 2022-11-14 NOTE — ANESTHESIA POSTPROCEDURE EVALUATION
Patient: Mignon Kaur    Procedure Summary     Date: 11/14/22 Room / Location: [facility] PAV OR 02 / [facility] OR PAV    Anesthesia Start: 1116 Anesthesia Stop: 1325    Procedure: Total Knee Revision (Left: Knee) Diagnosis:       Mechanical loosening of prosthetic joint, unspecified joint, initial encounter (CMS/[facility])      (Mechanical loosening of prosthetic joint, unspecified joint, initial encounter (CMS/[facility]) [T84.039A])    Surgeons: Jayson Galan MD Responsible Provider: Radha Ghotra MD    Anesthesia Type: spinal ASA Status: 2          Anesthesia Type: spinal  PACU Vitals    No data found in the last 10 encounters.           Anesthesia Post Evaluation    Pain score: 0  Pain management: adequate  Mode of pain management: IV medication  Patient location during evaluation: PACU  Patient participation: complete - patient participated  Level of consciousness: awake and alert  Cardiovascular status: acceptable  Airway Patency: adequate  Respiratory status: acceptable  Hydration status: acceptable  Anesthetic complications: no

## 2022-11-15 VITALS
WEIGHT: 178 LBS | HEART RATE: 73 BPM | OXYGEN SATURATION: 93 % | DIASTOLIC BLOOD PRESSURE: 62 MMHG | BODY MASS INDEX: 29.66 KG/M2 | HEIGHT: 65 IN | TEMPERATURE: 97.3 F | SYSTOLIC BLOOD PRESSURE: 129 MMHG | RESPIRATION RATE: 18 BRPM

## 2022-11-15 LAB
ANION GAP SERPL CALC-SCNC: 4 MEQ/L (ref 3–15)
BUN SERPL-MCNC: 14 MG/DL (ref 8–20)
CALCIUM SERPL-MCNC: 8.2 MG/DL (ref 8.9–10.3)
CHLORIDE SERPL-SCNC: 108 MEQ/L (ref 98–109)
CO2 SERPL-SCNC: 25 MEQ/L (ref 22–32)
CREAT SERPL-MCNC: 0.5 MG/DL (ref 0.6–1.1)
ERYTHROCYTE [DISTWIDTH] IN BLOOD BY AUTOMATED COUNT: 13.6 % (ref 11.7–14.4)
GFR SERPL CREATININE-BSD FRML MDRD: >60 ML/MIN/1.73M*2
GLUCOSE SERPL-MCNC: 120 MG/DL (ref 70–99)
HCT VFR BLDCO AUTO: 32.7 % (ref 35–45)
HGB BLD-MCNC: 10.4 G/DL (ref 11.8–15.7)
MCH RBC QN AUTO: 31 PG (ref 28–33.2)
MCHC RBC AUTO-ENTMCNC: 31.8 G/DL (ref 32.2–35.5)
MCV RBC AUTO: 97.6 FL (ref 83–98)
PDW BLD AUTO: 11.5 FL (ref 9.4–12.3)
PLATELET # BLD AUTO: 156 K/UL (ref 150–369)
POTASSIUM SERPL-SCNC: 5 MEQ/L (ref 3.6–5.1)
RBC # BLD AUTO: 3.35 M/UL (ref 3.93–5.22)
SODIUM SERPL-SCNC: 137 MEQ/L (ref 136–144)
WBC # BLD AUTO: 8.15 K/UL (ref 3.8–10.5)

## 2022-11-15 PROCEDURE — 97116 GAIT TRAINING THERAPY: CPT | Mod: GP,CQ

## 2022-11-15 PROCEDURE — 63700000 HC SELF-ADMINISTRABLE DRUG: Performed by: NURSE PRACTITIONER

## 2022-11-15 PROCEDURE — 80048 BASIC METABOLIC PNL TOTAL CA: CPT | Performed by: NURSE PRACTITIONER

## 2022-11-15 PROCEDURE — 97150 GROUP THERAPEUTIC PROCEDURES: CPT | Mod: GO

## 2022-11-15 PROCEDURE — 97535 SELF CARE MNGMENT TRAINING: CPT | Mod: GO

## 2022-11-15 PROCEDURE — 63600000 HC DRUGS/DETAIL CODE: Performed by: NURSE PRACTITIONER

## 2022-11-15 PROCEDURE — 25800000 HC PHARMACY IV SOLUTIONS: Performed by: NURSE PRACTITIONER

## 2022-11-15 PROCEDURE — 97150 GROUP THERAPEUTIC PROCEDURES: CPT | Mod: GP,CQ

## 2022-11-15 PROCEDURE — 85027 COMPLETE CBC AUTOMATED: CPT | Performed by: NURSE PRACTITIONER

## 2022-11-15 PROCEDURE — 97166 OT EVAL MOD COMPLEX 45 MIN: CPT | Mod: GO

## 2022-11-15 PROCEDURE — 36415 COLL VENOUS BLD VENIPUNCTURE: CPT | Performed by: NURSE PRACTITIONER

## 2022-11-15 RX ORDER — POLYETHYLENE GLYCOL 3350 17 G/17G
17 POWDER, FOR SOLUTION ORAL DAILY PRN
Refills: 0
Start: 2022-11-15 | End: 2022-12-15

## 2022-11-15 RX ORDER — ACETAMINOPHEN 325 MG/1
650 TABLET ORAL EVERY 6 HOURS PRN
Start: 2022-11-15 | End: 2022-12-15

## 2022-11-15 RX ORDER — OXYCODONE HYDROCHLORIDE 5 MG/1
5-10 TABLET ORAL EVERY 4 HOURS PRN
Start: 2022-11-15 | End: 2022-11-20

## 2022-11-15 RX ORDER — NAPROXEN SODIUM 220 MG/1
81 TABLET, FILM COATED ORAL 2 TIMES DAILY
Qty: 60 TABLET | Refills: 0 | Status: SHIPPED | OUTPATIENT
Start: 2022-11-15 | End: 2022-11-15 | Stop reason: HOSPADM

## 2022-11-15 RX ORDER — NAPROXEN SODIUM 220 MG/1
81 TABLET, FILM COATED ORAL 2 TIMES DAILY
Refills: 0
Start: 2022-11-15 | End: 2022-11-15 | Stop reason: SDUPTHER

## 2022-11-15 RX ORDER — AMOXICILLIN 250 MG
1 CAPSULE ORAL 2 TIMES DAILY
Refills: 0
Start: 2022-11-15

## 2022-11-15 RX ADMIN — ASPIRIN 81 MG CHEWABLE TABLET 81 MG: 81 TABLET CHEWABLE at 08:56

## 2022-11-15 RX ADMIN — ACETAMINOPHEN 650 MG: 325 TABLET ORAL at 04:09

## 2022-11-15 RX ADMIN — DEXAMETHASONE SODIUM PHOSPHATE 8 MG: 4 INJECTION, SOLUTION INTRA-ARTICULAR; INTRALESIONAL; INTRAMUSCULAR; INTRAVENOUS; SOFT TISSUE at 05:08

## 2022-11-15 RX ADMIN — SENNOSIDES AND DOCUSATE SODIUM 1 TABLET: 50; 8.6 TABLET ORAL at 08:56

## 2022-11-15 RX ADMIN — ACETAMINOPHEN 650 MG: 325 TABLET ORAL at 10:33

## 2022-11-15 RX ADMIN — KETOROLAC TROMETHAMINE 15 MG: 15 INJECTION, SOLUTION INTRAMUSCULAR; INTRAVENOUS at 04:09

## 2022-11-15 RX ADMIN — CEFAZOLIN 2 G: 2 INJECTION, POWDER, FOR SOLUTION INTRAMUSCULAR; INTRAVENOUS at 02:50

## 2022-11-15 RX ADMIN — OXYCODONE HYDROCHLORIDE 5 MG: 5 TABLET ORAL at 08:56

## 2022-11-15 RX ADMIN — POLYETHYLENE GLYCOL 3350 17 G: 17 POWDER, FOR SOLUTION ORAL at 08:56

## 2022-11-15 RX ADMIN — KETOROLAC TROMETHAMINE 15 MG: 15 INJECTION, SOLUTION INTRAMUSCULAR; INTRAVENOUS at 10:33

## 2022-11-15 RX ADMIN — OXYCODONE HYDROCHLORIDE 5 MG: 5 TABLET ORAL at 12:57

## 2022-11-15 ASSESSMENT — COGNITIVE AND FUNCTIONAL STATUS - GENERAL
HELP NEEDED FOR PERSONAL GROOMING: 3 - A LITTLE
STANDING UP FROM CHAIR USING ARMS: 4 - NONE
EATING MEALS: 4 - NONE
DRESSING REGULAR LOWER BODY CLOTHING: 3 - A LITTLE
CLIMB 3 TO 5 STEPS WITH RAILING: 4 - NONE
EATING MEALS: 4 - NONE
HELP NEEDED FOR BATHING: 3 - A LITTLE
HELP NEEDED FOR PERSONAL GROOMING: 3 - A LITTLE
DRESSING REGULAR UPPER BODY CLOTHING: 4 - NONE
AFFECT: WFL
HELP NEEDED FOR BATHING: 3 - A LITTLE
MOVING TO AND FROM BED TO CHAIR: 4 - NONE
TOILETING: 3 - A LITTLE
AFFECT: WFL
AFFECT: WFL
DRESSING REGULAR UPPER BODY CLOTHING: 4 - NONE
TOILETING: 3 - A LITTLE
WALKING IN HOSPITAL ROOM: 4 - NONE
DRESSING REGULAR LOWER BODY CLOTHING: 3 - A LITTLE

## 2022-11-15 NOTE — PATIENT CARE CONFERENCE
Care Progression Rounds Note  Date: 11/15/2022  Time: 10:58 AM     Patient Name: Mignon Kaur     Medical Record Number: [id number]   YOB: 1945  Sex: Female      Room/Bed: 5454    Admitting Diagnosis: Mechanical loosening of prosthetic joint, unspecified joint, initial encounter (CMS/[facility]) [T84.039A]  S/P revision of total knee, left [Z96.652]   Admit Date/Time: 11/14/2022  9:47 AM    Primary Diagnosis: S/P revision of total knee, left  Principal Problem: S/P revision of total knee, left    GMLOS: pending  Anticipated Discharge Date: 11/15/2022    AM-PAC:  Mobility Score: 18    Discharge Planning:  Current Living Arrangements: home  Anticipated Discharge Disposition: home with assistance    Barriers to Discharge:  None    Comments:  d/c home today    Participants:  advanced practice provider, physical therapy, , nursing, social work/services

## 2022-11-15 NOTE — PLAN OF CARE
Plan of Care Review  Plan of Care Reviewed With: patient  Progress: improving  Outcome Summary: OOB Ax1 with walker. Voiding without difficulty. Room air. Oxy prn for pain. Mepilex cdi.

## 2022-11-15 NOTE — PROGRESS NOTES
Patient: Mignon Kaur  Location:  [facility] 5PAV 5454  MRN:  [id number]  Today's date:  11/15/2022    Start: Pt received in therapy gym; she was agreeable to therapy  End: Pt in therapy gym at end of session; returned to room in chair by therapy aide; orthopedic advanced practitioners notified of patient performance     Mignon is a 77 y.o. female admitted on 11/14/2022 with Mechanical loosening of prosthetic joint, unspecified joint, initial encounter (CMS/[facility]) [T84.039A]  S/P revision of total knee, left [Z96.652]. Principal problem is S/P revision of total knee, left.    Past Medical History  Mignon has a past medical history of Aortic regurgitation, Arthritis, Deep vein thrombosis (CMS/[facility]), Fibromyalgia, Hydronephrosis, Mitral regurgitation, and MVP (mitral valve prolapse).    History of Present Illness   S/p L TKRevision      PT Vitals    Date/Time Pulse HR Source BP [facility]   11/15/22 0958 70 Monitor 129/62 MTC      PT Pain    Date/Time Pain Type Side/Orientation Location Rating: Rest Rating: Activity Interventions Sleep/Rest/Relaxation [facility]   11/15/22 0958 Pain Assessment -- -- -- -- position adjusted;medication administered;breathing exercises;care clustered;pillow support provided;quiet environment facilitated no problem identified;awake    11/15/22 0958 -- left knee 7 8 -- -- MTC          Prior Living Environment    Flowsheet Row Most Recent Value   People in Home spouse   Current Living Arrangements home   Living Environment Comment Pt lives w/ spouse in 2SH,  full bath w/ stall shower 1FL,  FF handrail on R to 2FL stall shower & bedroom        Prior Level of Function    Flowsheet Row Most Recent Value   Ambulation independent   Transferring independent   Toileting independent   Bathing independent   Dressing independent   Eating independent   Communication understands/communicates without difficulty   Swallowing swallows foods/liquids without difficulty   Prior Level of Function Comment PTA, IND w/  ADLs, bed mobility, & mobility activities w/o AD.   Assistive Device Currently Used at Home none           PT Evaluation and Treatment - 11/15/22 0942        PT Time Calculation    Start Time 0942     Stop Time 1019     Time Calculation (min) 37 min        Session Details    Document Type daily treatment/progress note     Mode of Treatment group therapy;physical therapy        General Information    Patient Profile Reviewed yes     General Observations of Patient Pt rec'd at gym     Existing Precautions/Restrictions fall;weight bearing        Weight-bearing Status    Left LE Weight-Bearing Status weight-bearing as tolerated (WBAT)        Cognition/Psychosocial    Affect/Mental Status (Cognition) WFL     Orientation Status (Cognition) oriented x 4     Follows Commands (Cognition) WFL     Cognitive Function WFL        Sensory    Hearing Status WFL        Range of Motion (ROM)    Left Lower Extremity (ROM) knee     Knee, Left (ROM) AROM 11-63        Bed Mobility    Comment (Bed Mobility) Pt rec'd OOB        Transfers    Transfers car transfer     Maintains Weight-Bearing Status (Transfers) able to maintain        Sit to Stand Transfer    [place], Sit to Stand Transfer modified independence     Assistive Device walker, front-wheeled        Stand to Sit Transfer    [place], Stand to Sit Transfer modified independence     Assistive Device walker, front-wheeled        Car Transfer    Transfer Technique sit-stand;stand-sit     [place], Car Transfer distant supervision     Assistive Device walker, front-wheeled     Comment VC +TC's needed for proper technique w/ hand placement for transfers. Tendency to pull up from the walker. Verbalized understanding of techniques educated.        Gait Training    [place], Gait modified independence     Assistive Device walker, front-wheeled     Distance in Feet 110 feet     Pattern (Gait) step-through     Deviations/Abnormal Patterns (Gait) antalgic;gait speed  decreased;step length decreased;stride length decreased;weight shifting decreased     Maintains Weight-bearing Status (Gait) able to maintain     Advanced Gait Activity curb negotiation;10 Meter Walk Test (Self-Selected Velocity)        Curb Negotiation    [place] distant supervision     Assistive Device walker, front-wheeled     Comment Vc's needed for proper sequencing with progression of RW, body positioning within walker and step to technique. Verbalized understanding of techniques educated.        10 Meter Walk Test (Self-Selected Velocity)    Trial One: Ten Meter Walk Test (sec) 18 seconds     Trial Two: Ten Meter Walk Test (sec) 16 seconds     Trial One: Gait Speed (m/s) 0.33 m/s     Trial Two: Gait Speed (m/s) 0.38 m/s     Average Gait Speed (m/s): Two Trials 0.36 m/s        Stairs Training    [place], Stairs not tested     Comment has Curb style step into home, no reciprical steps into home.        Balance    Balance Assessment sitting static balance;sitting dynamic balance;sit to stand dynamic balance;standing static balance;standing dynamic balance     Static Sitting Balance WFL     Dynamic Sitting Balance WFL     Sit to Stand Dynamic Balance WFL     Static Standing Balance WFL     Dynamic Standing Balance WFL     Comment, Balance Ax1 for functional mobility.        Therapeutic Exercise    Therapeutic Exercise lower extremity        Lower Extremity (Therapeutic Exercise)    Exercise Position/Type seated     General Exercise bilateral;ankle pumps;quad sets;LAQ (long arc quad);heel slides;gluteal sets     Reps and Sets x10     Comment Pt educated on TE's to perform to increase functional ROM and strength of BLE's.        AM-PAC (TM) - Mobility (Current Function)    Turning from your back to your side while in a flat bed without using bedrails? 4 - None     Moving from lying on your back to sitting on the side of a flat bed without using bedrails? 4 - None     Moving to and from a bed to a chair? 4  - None     Standing up from a chair using your arms? 4 - None     To walk in a hospital room? 4 - None     Climbing 3-5 steps with a railing? 4 - None     AM-PAC (TM) Mobility Score 24        Assessment/Plan (PT)    Daily Outcome Statement Training conducted with negotiation of safe functional mobility techniques which included gait w/ RW and transfer techniques. Pt. demo good carryover with instructions. Also educated in application of surgical precautions with daily activities/mobility. Educated on TE's to assist with recovery following surgery to promote healing and strengthening of BLE's. Pt's family not present for treatment. Pt educated on how family can provide assistance / supervision for safety. Pt cleared PT gym.               PT Assessment/Plan    Flowsheet Row Most Recent Value   PT Recommended Discharge Disposition home with assistance, home with outpatient services at 11/14/2022 1655   Anticipated Equipment Needs at Discharge (PT) none at 11/14/2022 1655   Patient/Family Therapy Goals Statement home at [place] at 11/14/2022 1655                  PT Goals    Flowsheet Row Most Recent Value   Bed Mobility Goal 1    Activity/Assistive Device bed mobility activities, all at 11/14/2022 1655   [place] independent at 11/14/2022 1655   Time Frame by discharge at 11/14/2022 1655   Progress/Outcome goal ongoing at 11/14/2022 1655   Transfer Goal 1    Activity/Assistive Device all transfers at 11/14/2022 1655   [place] independent at 11/14/2022 1655   Time Frame by discharge at 11/14/2022 1655   Progress/Outcome goal ongoing at 11/14/2022 1655   Gait Training Goal 1    Activity/Assistive Device gait (walking locomotion) at 11/14/2022 1655   [place] modified independence at 11/14/2022 1655   Distance 200 at 11/14/2022 1655   Time Frame by discharge at 11/14/2022 1655   Progress/Outcome goal ongoing at 11/14/2022 1655   ROM Goal 1    ROM Goal 1 0-90 at 11/14/2022 1655   Time Frame by discharge at  11/14/2022 1655   Progress/Outcome goal ongoing at 11/14/2022 1655   Stairs Goal 1    Activity/Assistive Device stairs, all skills at 11/14/2022 1655   [place] independent at 11/14/2022 1655   Number of Stairs 10 at 11/14/2022 1655   Time Frame by discharge at 11/14/2022 1655   Progress/Outcome goal ongoing at 11/14/2022 1655

## 2022-11-15 NOTE — PROGRESS NOTES
Patient:  Mignon Kaur  Location:  [facility] 5PAV 5454  MRN:  [id number]  Today's date:  11/15/2022    Start: RN aware & cleared for therapy. Pt received sitting in recliner in ortho gym, agreeable to OT session.    End: Pt left sitting in recliner in ortho gym, alarm activated. Call bell & personal items within reach.  NAD & VSS. Nurse notified of session progress & pt outcome.    Mignon is a 77 y.o. female admitted on 11/14/2022 with Mechanical loosening of prosthetic joint, unspecified joint, initial encounter (CMS/[facility]) [T84.039A]  S/P revision of total knee, left [Z96.652]. Principal problem is S/P revision of total knee, left.    Past Medical History  Mignon has a past medical history of Aortic regurgitation, Arthritis, Deep vein thrombosis (CMS/[facility]), Fibromyalgia, Hydronephrosis, Mitral regurgitation, and MVP (mitral valve prolapse).    History of Present Illness   S/p L TKRevision      OT Vitals    Date/Time Pulse HR Source BP [facility]   11/15/22 0958 70 Monitor 129/62 MTC      OT Pain    Date/Time Side/Orientation Location Rating: Rest Rating: Activity Interventions [facility]   11/15/22 0958 left knee 7 8 position adjusted MTC          Prior Living Environment    Flowsheet Row Most Recent Value   People in Home spouse   Current Living Arrangements home   Living Environment Comment Pt lives w/ spouse in 2SH,  full bath w/ stall shower 1FL,  FF handrail on R to 2FL stall shower & bedroom        Prior Level of Function    Flowsheet Row Most Recent Value   Ambulation independent   Transferring independent   Toileting independent   Bathing independent   Dressing independent   Eating independent   Communication understands/communicates without difficulty   Swallowing swallows foods/liquids without difficulty   Prior Level of Function Comment PTA, IND w/ ADLs, bed mobility, & mobility activities w/o AD.   Assistive Device Currently Used at Home --  [Owns RW]        Occupational Profile    Flowsheet Row Most Recent  Value   Reason for Services/Referral ADLs / Ambulation dysfx   Patient Goals To go home           OT Evaluation and Treatment - 11/15/22 0954        OT Time Calculation    Start Time 0954     Stop Time 1025     Time Calculation (min) 31 min        Session Details    Document Type daily treatment/progress note     Mode of Treatment group therapy;occupational therapy        General Information    Patient Profile Reviewed yes     Patient/Family/Caregiver Comments/Observations RN cleared for therapy     General Observations of Patient Patient received sitting in recliner in ortho gym     Existing Precautions/Restrictions fall;weight bearing        Weight-bearing Status    Left LE Weight-Bearing Status weight-bearing as tolerated (WBAT)        Cognition/Psychosocial    Affect/Mental Status (Cognition) WFL     Orientation Status (Cognition) oriented x 4     Follows Commands (Cognition) WFL     Cognitive Function WFL        Hearing Assessment    Hearing Status WFL        Vision Assessment/Intervention    Visual Impairment/Limitations corrective lenses for reading        Sensory Assessment    Sensory Assessment (Somatosensory) UE sensation intact;bilateral UE        Range of Motion (ROM)    Range of Motion ROM is WFL;bilateral upper extremities        Strength (Manual Muscle Testing)    Strength (Manual Muscle Testing) strength is WFL;bilateral upper extremities        Bed Mobility    [place], Supine to Sit supervision     [place], Sit to Supine supervision     Assistive Device none     Comment (Bed Mobility) Flat surface. EOB sitting balance good.        Transfers    Transfers other (see comments)     Maintains Weight-Bearing Status (Transfers) able to maintain     Comment SUP w/ RW        Bed to Chair Transfer    [place], Bed to Chair supervision     Verbal Cues safety;technique     Assistive Device walker, front-wheeled        Chair to Bed Transfer    [place], Chair to Bed supervision     Verbal Cues  safety;technique     Assistive Device walker, front-wheeled        Sit to Stand Transfer    [place], Sit to Stand Transfer supervision     Verbal Cues safety;technique     Assistive Device walker, front-wheeled        Stand to Sit Transfer    [place], Stand to Sit Transfer supervision     Verbal Cues safety;technique     Assistive Device walker, front-wheeled        Safety Issues, Functional Mobility    Impairments Affecting Function (Mobility) endurance/activity tolerance;pain        Balance    Static Sitting Balance WFL     Dynamic Sitting Balance mild impairment     Static Standing Balance mild impairment;supported     Dynamic Standing Balance mild impairment;supported        Functional Reach Test    Trial One: Functional Reach Test (in) 20 inches     Trial Two: Functional Reach Test (in) 22 inches     Average (in) 21 inches        Motor Skills    Functional Endurance Fair +        Lower Body Dressing    Tasks doff;don;socks     Self-Performance dons/doffs left sock;dons/doffs right sock     [place] close supervision     Position supported sitting     Adaptive Equipment other (see comments)   hip kit    Comment Educated on LE AE to maximize IND w/ LE ADLs        BADL Safety/Performance    Impairments, BADL Safety/Performance endurance/activity tolerance;pain        AM-PAC (TM) - ADL (Current Function)    Putting on and taking off regular lower body clothing? 3 - A Little     Bathing? 3 - A Little     Toileting? 3 - A Little     Putting on/taking off regular upper body clothing? 4 - None     How much help for taking care of personal grooming? 3 - A Little     Eating meals? 4 - None     AM-PAC (TM) ADL Score 20        Assessment/Plan (OT)    Daily Outcome Statement AM-PAC: 20. OT tx session completed. Pt just about at baseline. OT recommendations provided during session to maximize IND & endurance w/ ADLs, bed mobility, & RW mobility activities. Supportive family available. No additional acute care  OT goals needed at this time. D/C OT.     Rehab Potential --   cleared for d/c    Therapy Frequency --   cleared for d/c    Planned Therapy Interventions --   cleared for d/c              OT Assessment/Plan    Flowsheet Row Most Recent Value   OT Recommended Discharge Disposition home, home with assistance at 11/15/2022 0954   Anticipated Equipment Needs At Discharge (OT) bathing equipment, commode, 3-in-1, dressing equipment, shower chair at 11/15/2022 0954   Patient/Family Therapy Goal Statement To go home at 11/15/2022 0954                    Education Documentation  Relaxation, taught by Carmen Watkins at 11/15/2022 10:58 AM.  Learner: Patient  Readiness: Eager  Method: Explanation  Response: Verbalizes Understanding  Comment: Patient instructed in econ techniques to maximize IND & endurance w/ ADLs, bed mobility, & RW mobility activities.    Proper Positioning, taught by Carmen Watkins at 11/15/2022 10:58 AM.  Learner: Patient  Readiness: Eager  Method: Explanation  Response: Verbalizes Understanding  Comment: Patient instructed in econ techniques to maximize IND & endurance w/ ADLs, bed mobility, & RW mobility activities.    Prioritize Tasks, taught by Carmen Watkins at 11/15/2022 10:58 AM.  Learner: Patient  Readiness: Eager  Method: Explanation  Response: Verbalizes Understanding  Comment: Patient instructed in econ techniques to maximize IND & endurance w/ ADLs, bed mobility, & RW mobility activities.    Pace Activity, taught by Carmen Watkins at 11/15/2022 10:58 AM.  Learner: Patient  Readiness: Eager  Method: Explanation  Response: Verbalizes Understanding  Comment: Patient instructed in econ techniques to maximize IND & endurance w/ ADLs, bed mobility, & RW mobility activities.    Breathing, taught by Carmen Watkins at 11/15/2022 10:58 AM.  Learner: Patient  Readiness: Eager  Method: Explanation  Response: Verbalizes Understanding  Comment: Patient instructed in econ techniques to maximize  IND & endurance w/ ADLs, bed mobility, & RW mobility activities.    Purpose, taught by Carmen Watkins at 11/15/2022 10:58 AM.  Learner: Patient  Readiness: Eager  Method: Explanation  Response: Verbalizes Understanding  Comment: Patient instructed in econ techniques to maximize IND & endurance w/ ADLs, bed mobility, & RW mobility activities.    Relaxation, taught by Carmen Watkins at 11/15/2022  9:13 AM.  Learner: Patient  Readiness: Acceptance  Method: Explanation  Response: Verbalizes Understanding  Comment: Patient instructed in econ techniques to maximize IND & endurance w/ ADLs & RW mobility activities.    Proper Positioning, taught by Carmen Watkins at 11/15/2022  9:13 AM.  Learner: Patient  Readiness: Acceptance  Method: Explanation  Response: Verbalizes Understanding  Comment: Patient instructed in econ techniques to maximize IND & endurance w/ ADLs & RW mobility activities.    Prioritize Tasks, taught by Carmen Watkins at 11/15/2022  9:13 AM.  Learner: Patient  Readiness: Acceptance  Method: Explanation  Response: Verbalizes Understanding  Comment: Patient instructed in econ techniques to maximize IND & endurance w/ ADLs & RW mobility activities.    Pace Activity, taught by Carmen Watkins at 11/15/2022  9:13 AM.  Learner: Patient  Readiness: Acceptance  Method: Explanation  Response: Verbalizes Understanding  Comment: Patient instructed in econ techniques to maximize IND & endurance w/ ADLs & RW mobility activities.    Breathing, taught by Carmen Watkins at 11/15/2022  9:13 AM.  Learner: Patient  Readiness: Acceptance  Method: Explanation  Response: Verbalizes Understanding  Comment: Patient instructed in econ techniques to maximize IND & endurance w/ ADLs & RW mobility activities.    Purpose, taught by Carmen Watkins at 11/15/2022  9:13 AM.  Learner: Patient  Readiness: Acceptance  Method: Explanation  Response: Verbalizes Understanding  Comment: Patient instructed in econ techniques  to maximize IND & endurance w/ ADLs & RW mobility activities.          OT Goals    Flowsheet Row Most Recent Value   Bed Mobility Goal 1    Activity/Assistive Device bed mobility activities, all at 11/15/2022 0954   [place] supervision required at 11/15/2022 0954   Time Frame by discharge at 11/15/2022 0954   Progress/Outcome goal met at 11/15/2022 0954   Transfer Goal 1    Activity/Assistive Device sit-to-stand/stand-to-sit, bed-to-chair/chair-to-bed at 11/15/2022 0954   [place] supervision required at 11/15/2022 0954   Time Frame by discharge at 11/15/2022 0954   Progress/Outcome goal met at 11/15/2022 0954   Dressing Goal 1    Activity/Adaptive Equipment lower body dressing at 11/15/2022 0954   [place] supervision required at 11/15/2022 0954   Time Frame by discharge at 11/15/2022 0954   Progress/Outcome goal partially met at 11/15/2022 0954

## 2022-11-15 NOTE — PROGRESS NOTES
Orthopaedics Progress Note    [S]  No acute distress. Pain well controlled.     [O]   Vitals:    11/15/22 0346   BP: (!) 107/57   Pulse: 61   Resp: 18   Temp: 36.3 °C (97.4 °F)   SpO2: 94%       Physical Exam:    LLE  SILT s/s/spn/dpn/t nerves  +DP  +EHL/FHL/PF/DF  Dressing C/d/i    [A/P]   77 y.o. female status post revision left total knee arthroplasty, 1 Day Post-Op, doing well    -DVT prophylaxis> asa 81 BID  -Weight bearing as tolerated   -Analgesia  -Physical therapy  -Encourage incentive spirometer  - follow OR cultures/aspiration > NGTD    Sujatha Shrestha MD

## 2022-11-15 NOTE — PLAN OF CARE
Problem: Adult Inpatient Plan of Care  Goal: Plan of Care Review  Outcome: Progressing  Flowsheets (Taken 11/15/2022 0912)  Progress: improving  Plan of Care Reviewed With: patient  Outcome Summary: OT initial eval completed. Pt requires (A) w/ ADLs & RW mobility activities. OT to continue POC in acute care setting.     Problem: Joint Function Impaired (Knee Arthroplasty)  Goal: Optimal Functional Ability  Outcome: Progressing

## 2022-11-15 NOTE — PLAN OF CARE
Problem: Adult Inpatient Plan of Care  Goal: Readiness for Transition of Care  Outcome: Met  Intervention: Mutually Develop Transition Plan  Flowsheets (Taken 11/15/2022 1151)  Anticipated Discharge Disposition:   home with assistance   home with outpatient services  Equipment Needed After Discharge: none  Assistive Device/Animal Currently Used at Home: none  Anticipated Changes Related to Illness: none  Transportation Concerns: car, none  Readmission Within the Last 30 Days: no previous admission in last 30 days  Patient/Family Anticipated Services at Transition: none  Patient/Family Anticipates Transition to: home with family  Transportation Anticipated: family or friend will provide  Concerns to be Addressed: no discharge needs identified  Offered/Gave Vendor List: no  Type of Outpatient Services: physical therapy     Pt s/p L TKA revision on 11/14/22 with . SW met with pt at the bedside this morning. Pt reported she lives with her  in a 2SH with 1+1STE from the garage. Pt has stall showers on both 1st and 2nd flrs. Pt was independent with ADLs PTA. Pt has no AD, O2, HC, or SNF/[facility] stays. Pt has an AD/LW. PCP and Pharmacy confirmed. IMM completed. Pt's  will transport home. No other d/c needs identified.    Discharge Plan  Home   will transport

## 2022-11-15 NOTE — PROGRESS NOTES
Patient:  Mignon Kaur  Location:  [facility] 5PAV 5454  MRN:  [id number]  Today's date:  11/15/2022     Start: RN aware & cleared for therapy. Pt received standing in bathroom w/ tech, agreeable to OT session.    End: Pt left in recliner, alarm activated. Call bell & personal items within reach.  NAD & VSS. Nurse notified of session progress & pt outcome.    Mignon is a 77 y.o. female admitted on 11/14/2022 with Mechanical loosening of prosthetic joint, unspecified joint, initial encounter (CMS/[facility]) [T84.039A]  S/P revision of total knee, left [Z96.652]. Principal problem is S/P revision of total knee, left.    Past Medical History  Mignon has a past medical history of Aortic regurgitation, Arthritis, Deep vein thrombosis (CMS/[facility]), Fibromyalgia, Hydronephrosis, Mitral regurgitation, and MVP (mitral valve prolapse).    History of Present Illness   S/p L TKRevision      OT Vitals    Date/Time Pulse HR Source SpO2 Pt Activity O2 Therapy BP BP Location BP Method Pt Position [facility]   11/15/22 0815 74 Monitor 92 % At rest None (Room air) 133/62 Right upper arm Automatic Sitting MS   11/15/22 0830 74 Monitor 92 % At rest After activity None (Room air) 132/60 Right upper arm Automatic Sitting MS      OT Pain    Date/Time Pain Type Side/Orientation Location Rating: Rest Rating: Activity Description Interventions [facility]   11/15/22 0815 Pain Assessment left knee 6 - moderate-severe pain 6 - moderate-severe pain sore position adjusted MS          Prior Living Environment    Flowsheet Row Most Recent Value   People in Home spouse   Current Living Arrangements home   Living Environment Comment Pt lives w/ spouse in 2SH,  full bath w/ stall shower 1FL,  FF handrail on R to 2FL stall shower & bedroom        Prior Level of Function    Flowsheet Row Most Recent Value   Ambulation independent   Transferring independent   Toileting independent   Bathing independent   Dressing independent   Eating independent   Communication  understands/communicates without difficulty   Swallowing swallows foods/liquids without difficulty   Prior Level of Function Comment PTA, IND w/ ADLs, bed mobility, & mobility activities w/o AD.   Assistive Device Currently Used at Home --  [Owns RW]        Occupational Profile    Flowsheet Row Most Recent Value   Reason for Services/Referral ADLs / Ambulation dysfx   Patient Goals To get stronger           OT Evaluation and Treatment - 11/15/22 0813        OT Time Calculation    Start Time 0813     Stop Time 0833     Time Calculation (min) 20 min        Session Details    Document Type initial evaluation     Mode of Treatment occupational therapy        General Information    Patient Profile Reviewed yes     Onset of Illness/Injury or Date of Surgery 11/14/22     Referring Physician Adama     Patient/Family/Caregiver Comments/Observations RN cleared for therapy     General Observations of Patient Patient received standing w/ tech in bathroom     Existing Precautions/Restrictions fall;weight bearing        Weight-bearing Status    Left LE Weight-Bearing Status weight-bearing as tolerated (WBAT)         Services    Do You Speak a Language Other Than English at Home? no     Is an  Needed/Used? No        Living Environment    Primary Care Provided by self        Cognition/Psychosocial    Affect/Mental Status (Cognition) WFL     Orientation Status (Cognition) oriented x 4     Follows Commands (Cognition) WFL     Cognitive Function WFL        Hearing Assessment    Hearing Status WFL        Vision Assessment/Intervention    Visual Impairment/Limitations corrective lenses for reading        Sensory Assessment    Sensory Assessment (Somatosensory) UE sensation intact;bilateral UE        Range of Motion (ROM)    Range of Motion ROM is WFL;bilateral upper extremities        Strength (Manual Muscle Testing)    Strength (Manual Muscle Testing) strength is WFL;bilateral upper extremities        Bed  Mobility    Comment (Bed Mobility) OOB upon arrival        Transfers    Transfers other (see comments)     Maintains Weight-Bearing Status (Transfers) able to maintain     Comment CLS to complete functional mobility into hallway and back w/ RW        Bed to Chair Transfer    Comment OOB upon arrival        Chair to Bed Transfer    Comment OOB upon arrival        Sit to Stand Transfer    [place], Sit to Stand Transfer close supervision     Verbal Cues safety;technique     Assistive Device walker, front-wheeled        Stand to Sit Transfer    [place], Stand to Sit Transfer close supervision     Verbal Cues safety;technique     Assistive Device walker, front-wheeled        Safety Issues, Functional Mobility    Impairments Affecting Function (Mobility) endurance/activity tolerance;pain        Balance    Static Sitting Balance WFL     Dynamic Sitting Balance mild impairment;supported     Static Standing Balance mild impairment;supported     Dynamic Standing Balance mild impairment;supported        Motor Skills    Functional Endurance Fair        Upper Body Dressing    Tasks don;front-opening garment     Self-Performance threads left arm, shirt;threads right arm, shirt;pulls shirt down/adjusts     [place] close supervision     Position supported sitting     Adaptive Equipment none        Lower Body Dressing    Tasks don;pants/bottoms     Self-Performance threads left leg, underpants;threads right leg, underpants;threads left leg, pants/shorts;threads right leg, pants/shorts     [place] minimum assist (75% or more patient effort)     Position supported sitting     Adaptive Equipment none        BADL Safety/Performance    Impairments, BADL Safety/Performance endurance/activity tolerance;pain        AM-PAC (TM) - ADL (Current Function)    Putting on and taking off regular lower body clothing? 3 - A Little     Bathing? 3 - A Little     Toileting? 3 - A Little     Putting on/taking off regular upper body  clothing? 4 - None     How much help for taking care of personal grooming? 3 - A Little     Eating meals? 4 - None     AM-PAC (TM) ADL Score 20        Assessment/Plan (OT)    Daily Outcome Statement AM-PAC: 20. OT initial eval completed. Pt requires CLS w/ sit-to-stand, stand-to-sit, UB dressing, & RW mobility activities; and MIN A w/ LB dressing. Pt limited by decr endurance/activity tolerance and pain. OT will continue POC in acute care setting. OT will continue & follow recommended D/C to home w/ assistance.     Rehab Potential good, to achieve stated therapy goals     Therapy Frequency 3-5 times/wk     Planned Therapy Interventions activity tolerance training;adaptive equipment training;BADL retraining;IADL retraining;occupation/activity based interventions;patient/caregiver education/training               OT Assessment/Plan    Flowsheet Row Most Recent Value   OT Recommended Discharge Disposition home, home with assistance at 11/15/2022 0813   Anticipated Equipment Needs At Discharge (OT) bathing equipment, commode, 3-in-1, dressing equipment, shower chair at 11/15/2022 0813   Patient/Family Therapy Goal Statement To get stronger at 11/15/2022 0813                    Education Documentation  Relaxation, taught by Carmen Watkins at 11/15/2022  9:13 AM.  Learner: Patient  Readiness: Acceptance  Method: Explanation  Response: Verbalizes Understanding  Comment: Patient instructed in econ techniques to maximize IND & endurance w/ ADLs & RW mobility activities.    Proper Positioning, taught by Carmen Watkins at 11/15/2022  9:13 AM.  Learner: Patient  Readiness: Acceptance  Method: Explanation  Response: Verbalizes Understanding  Comment: Patient instructed in econ techniques to maximize IND & endurance w/ ADLs & RW mobility activities.    Prioritize Tasks, taught by Carmen Watkins at 11/15/2022  9:13 AM.  Learner: Patient  Readiness: Acceptance  Method: Explanation  Response: Verbalizes  Understanding  Comment: Patient instructed in econ techniques to maximize IND & endurance w/ ADLs & RW mobility activities.    Pace Activity, taught by Carmen Watkins at 11/15/2022  9:13 AM.  Learner: Patient  Readiness: Acceptance  Method: Explanation  Response: Verbalizes Understanding  Comment: Patient instructed in econ techniques to maximize IND & endurance w/ ADLs & RW mobility activities.    Breathing, taught by Carmen Watkins at 11/15/2022  9:13 AM.  Learner: Patient  Readiness: Acceptance  Method: Explanation  Response: Verbalizes Understanding  Comment: Patient instructed in econ techniques to maximize IND & endurance w/ ADLs & RW mobility activities.    Purpose, taught by Carmen Watkins at 11/15/2022  9:13 AM.  Learner: Patient  Readiness: Acceptance  Method: Explanation  Response: Verbalizes Understanding  Comment: Patient instructed in econ techniques to maximize IND & endurance w/ ADLs & RW mobility activities.          OT Goals    Flowsheet Row Most Recent Value   Bed Mobility Goal 1    Activity/Assistive Device bed mobility activities, all at 11/15/2022 0813   [place] supervision required at 11/15/2022 0813   Time Frame by discharge at 11/15/2022 0813   Progress/Outcome goal ongoing at 11/15/2022 0813   Transfer Goal 1    Activity/Assistive Device sit-to-stand/stand-to-sit, bed-to-chair/chair-to-bed at 11/15/2022 0813   [place] supervision required at 11/15/2022 0813   Time Frame by discharge at 11/15/2022 0813   Progress/Outcome goal ongoing at 11/15/2022 0813   Dressing Goal 1    Activity/Adaptive Equipment lower body dressing at 11/15/2022 0813   [place] supervision required at 11/15/2022 0813   Time Frame by discharge at 11/15/2022 0813   Progress/Outcome goal ongoing at 11/15/2022 0813

## 2022-11-28 LAB
GRAM STN SPEC: NORMAL
MICROORGANISM SPEC CULT: NORMAL
MICROORGANISM SPEC CULT: NORMAL

## 2023-02-08 ENCOUNTER — APPOINTMENT (RX ONLY)
Dept: URBAN - METROPOLITAN AREA CLINIC 23 | Facility: CLINIC | Age: 78
Setting detail: DERMATOLOGY
End: 2023-02-08

## 2023-02-08 DIAGNOSIS — Z41.9 ENCOUNTER FOR PROCEDURE FOR PURPOSES OTHER THAN REMEDYING HEALTH STATE, UNSPECIFIED: ICD-10-CM

## 2023-02-08 DIAGNOSIS — L57.0 ACTINIC KERATOSIS: ICD-10-CM

## 2023-02-08 PROCEDURE — ? MEDICARE ABN

## 2023-02-08 PROCEDURE — ? PREMALIGNANT DESTRUCTION

## 2023-02-08 PROCEDURE — 17000 DESTRUCT PREMALG LESION: CPT

## 2023-02-08 PROCEDURE — ? COSMETIC CONSULTATION: PICOSURE LASER

## 2023-02-08 PROCEDURE — ? COSMETIC CONSULTATION - PULSED-DYE LASER

## 2023-02-08 PROCEDURE — ? COUNSELING

## 2023-02-08 ASSESSMENT — LOCATION DETAILED DESCRIPTION DERM
LOCATION DETAILED: LEFT SUPERIOR CENTRAL BUCCAL CHEEK
LOCATION DETAILED: RIGHT CENTRAL MALAR CHEEK
LOCATION DETAILED: RIGHT INFERIOR CENTRAL MALAR CHEEK
LOCATION DETAILED: LEFT INFERIOR CENTRAL MALAR CHEEK
LOCATION DETAILED: RIGHT SUPERIOR CENTRAL BUCCAL CHEEK

## 2023-02-08 ASSESSMENT — LOCATION ZONE DERM: LOCATION ZONE: FACE

## 2023-02-08 ASSESSMENT — LOCATION SIMPLE DESCRIPTION DERM
LOCATION SIMPLE: LEFT CHEEK
LOCATION SIMPLE: RIGHT CHEEK

## 2023-02-08 NOTE — PROCEDURE: MEDICARE ABN
Procedure (Limit To 20 Characters): premalignant destruction
Payment Option: Option 1: Bill Medicare, await for decision on payment.
Reason?: Additional Information
Reason?: non-covered service
Detail Level: Detailed

## 2023-02-08 NOTE — PROCEDURE: PREMALIGNANT DESTRUCTION
Render Note In Bullet Format When Appropriate: No
Post-Care Instructions: I reviewed with the patient in detail post-care instructions. Patient is to wear sunprotection, and avoid picking at any of the treated lesions. Pt may apply Vaseline to crusted or scabbing areas.
Method: liquid nitrogen
Consent: The patient's consent was obtained including but not limited to risks of crusting, scabbing, blistering, scarring, darker or lighter pigmentary change, recurrence, incomplete removal and infection.
Detail Level: Detailed
Anesthesia Volume In Cc: 0

## (undated) DEVICE — CULTURETTE

## (undated) DEVICE — SYRINGE ONLY  LUER SLIP TIP 30ML

## (undated) DEVICE — ***USE 57698*** SLEEVE FLOWTRON DVT CALF SINGLE USE

## (undated) DEVICE — BLADE SCALPEL #10

## (undated) DEVICE — DRAPE-U NON-STERILE

## (undated) DEVICE — *T* 3.2MM X 18.3MM METAL CUT HELIOCOIAL RASP

## (undated) DEVICE — SOLN IRRIG STER WATER 1000ML

## (undated) DEVICE — SOLN IRRIG .9%SOD 3L

## (undated) DEVICE — KIT CEMENT MIXING CARTRIDGE AND NOZZLE

## (undated) DEVICE — BLADE SAGITTAL #127 STABLECUT

## (undated) DEVICE — BLADE SAW SABO

## (undated) DEVICE — SUTURE MONOCRYL 3-0  Y497G

## (undated) DEVICE — CUFF TOURNIQUET DISP 30 X 4

## (undated) DEVICE — GLOVE SZ 7.5 PROTEXIS PI

## (undated) DEVICE — APPLICATOR CHLORAPREP 26ML ORANGE TINT

## (undated) DEVICE — SUTURE VICRYL PLUS 2-0 VCP869H

## (undated) DEVICE — BLADE SAGITTAL #152 STRYKER NARROW THICK NO OFFSET 12.5MM

## (undated) DEVICE — BLANKET UPPER BODY BAIR HUGGER

## (undated) DEVICE — GLOVE SZ 8 PROTEXIS PI

## (undated) DEVICE — COVER LIGHTHANDLE

## (undated) DEVICE — PAD GROUND ELECTROSURGICAL W/CORD

## (undated) DEVICE — VEST STERILE

## (undated) DEVICE — ADHESIVE SKIN DERMABOND ADVANCED 0.7ML

## (undated) DEVICE — MANIFOLD FOUR PORT NEPTUNE

## (undated) DEVICE — STOCKINETTE 8IN STERILE MEDC

## (undated) DEVICE — PACK TOTAL JOINT

## (undated) DEVICE — SET HANDPIECE INTERPULSE

## (undated) DEVICE — SUTURE VICRYL 1 J699H OS-8 27IN UNDYED

## (undated) DEVICE — HOOD FLYTE SURGICOOL

## (undated) DEVICE — BANDAGE ESMARK 6X12 LATEX FREE

## (undated) DEVICE — TUBING SMOKE EVAC PENCIL COATED

## (undated) DEVICE — NEEDLE DISP HYPO 21G X1 1/2 IN

## (undated) DEVICE — DRESSING MEPILEX 4X10 BORDER

## (undated) DEVICE — SUTURE STRATAFIX PDO 1 CTX TAPER 36CM

## (undated) DEVICE — GLOVE SZ 8 LINER PROTEXIS PI BL

## (undated) DEVICE — DRAPE TOWEL 17X23 NON-STERILE